# Patient Record
Sex: MALE | Race: WHITE | Employment: FULL TIME | URBAN - METROPOLITAN AREA
[De-identification: names, ages, dates, MRNs, and addresses within clinical notes are randomized per-mention and may not be internally consistent; named-entity substitution may affect disease eponyms.]

---

## 2017-01-19 ENCOUNTER — TELEPHONE (OUTPATIENT)
Dept: GASTROENTEROLOGY | Facility: CLINIC | Age: 62
End: 2017-01-19

## 2017-01-19 NOTE — TELEPHONE ENCOUNTER
Pt. States that he is having a CLN done on 1/24/17, and that [facility] with [facility] [employer]. He is requesting for RN to call in for the auth. Contact: [employer] [facility] [address]  Telephone:  [phone] - [place] Dept.     Pt's

## 2017-01-19 NOTE — TELEPHONE ENCOUNTER
Contacted Medical Cost mgmt at # below. Spoke with Jazmin @ [facility]. Screening colonoscopy does not require clinicals to be faxed. Obtained approval.    Moni Washington [id number].     Left detailed message that approved

## 2017-01-24 ENCOUNTER — SURGERY (OUTPATIENT)
Age: 62
End: 2017-01-24

## 2017-01-27 ENCOUNTER — TELEPHONE (OUTPATIENT)
Dept: GASTROENTEROLOGY | Facility: CLINIC | Age: 62
End: 2017-01-27

## 2017-02-14 RX ORDER — LOSARTAN POTASSIUM AND HYDROCHLOROTHIAZIDE 12.5; 1 MG/1; MG/1
TABLET ORAL
Qty: 30 TABLET | Refills: 5 | Status: SHIPPED | OUTPATIENT
Start: 2017-02-14 | End: 2017-04-15

## 2017-02-14 RX ORDER — AMLODIPINE BESYLATE 10 MG/1
TABLET ORAL
Qty: 30 TABLET | Refills: 5 | Status: SHIPPED | OUTPATIENT
Start: 2017-02-14 | End: 2017-04-15

## 2017-03-09 ENCOUNTER — TELEPHONE (OUTPATIENT)
Dept: INTERNAL MEDICINE CLINIC | Facility: CLINIC | Age: 62
End: 2017-03-09

## 2017-03-09 DIAGNOSIS — Z00.00 ROUTINE PHYSICAL EXAMINATION: Primary | ICD-10-CM

## 2017-04-14 ENCOUNTER — LAB ENCOUNTER (OUTPATIENT)
Dept: LAB | Facility: HOSPITAL | Age: 62
End: 2017-04-14
Attending: INTERNAL MEDICINE
Payer: COMMERCIAL

## 2017-04-14 ENCOUNTER — TELEPHONE (OUTPATIENT)
Dept: INTERNAL MEDICINE CLINIC | Facility: CLINIC | Age: 62
End: 2017-04-14

## 2017-04-14 DIAGNOSIS — Z00.00 ROUTINE PHYSICAL EXAMINATION: ICD-10-CM

## 2017-04-14 PROCEDURE — 85025 COMPLETE CBC W/AUTO DIFF WBC: CPT

## 2017-04-14 PROCEDURE — 80053 COMPREHEN METABOLIC PANEL: CPT

## 2017-04-14 PROCEDURE — 80061 LIPID PANEL: CPT

## 2017-04-14 PROCEDURE — 84443 ASSAY THYROID STIM HORMONE: CPT

## 2017-04-14 PROCEDURE — 36415 COLL VENOUS BLD VENIPUNCTURE: CPT

## 2017-04-14 PROCEDURE — 82607 VITAMIN B-12: CPT

## 2017-04-15 RX ORDER — LOSARTAN POTASSIUM AND HYDROCHLOROTHIAZIDE 12.5; 1 MG/1; MG/1
TABLET ORAL
Qty: 90 TABLET | Refills: 0 | Status: SHIPPED | OUTPATIENT
Start: 2017-04-15 | End: 2017-04-22

## 2017-04-15 RX ORDER — AMLODIPINE BESYLATE 10 MG/1
TABLET ORAL
Qty: 90 TABLET | Refills: 0 | Status: SHIPPED | OUTPATIENT
Start: 2017-04-15 | End: 2017-04-22

## 2017-04-22 ENCOUNTER — OFFICE VISIT (OUTPATIENT)
Dept: INTERNAL MEDICINE CLINIC | Facility: CLINIC | Age: 62
End: 2017-04-22

## 2017-04-22 VITALS
WEIGHT: 231 LBS | BODY MASS INDEX: 33.45 KG/M2 | HEART RATE: 77 BPM | SYSTOLIC BLOOD PRESSURE: 147 MMHG | HEIGHT: 69.5 IN | DIASTOLIC BLOOD PRESSURE: 78 MMHG | RESPIRATION RATE: 16 BRPM

## 2017-04-22 DIAGNOSIS — E78.5 DYSLIPIDEMIA: ICD-10-CM

## 2017-04-22 DIAGNOSIS — Z00.00 ROUTINE PHYSICAL EXAMINATION: Primary | ICD-10-CM

## 2017-04-22 DIAGNOSIS — K42.9 UMBILICAL HERNIA WITHOUT OBSTRUCTION AND WITHOUT GANGRENE: ICD-10-CM

## 2017-04-22 DIAGNOSIS — I10 ESSENTIAL HYPERTENSION WITH GOAL BLOOD PRESSURE LESS THAN 140/90: ICD-10-CM

## 2017-04-22 PROCEDURE — 99396 PREV VISIT EST AGE 40-64: CPT | Performed by: INTERNAL MEDICINE

## 2017-04-22 RX ORDER — LOSARTAN POTASSIUM AND HYDROCHLOROTHIAZIDE 12.5; 1 MG/1; MG/1
TABLET ORAL
Qty: 90 TABLET | Refills: 3 | Status: SHIPPED | OUTPATIENT
Start: 2017-04-22 | End: 2018-05-07

## 2017-04-22 RX ORDER — AMLODIPINE BESYLATE 10 MG/1
TABLET ORAL
Qty: 90 TABLET | Refills: 3 | Status: SHIPPED | OUTPATIENT
Start: 2017-04-22 | End: 2018-05-05

## 2017-04-22 NOTE — PROGRESS NOTES
HPI:    Patient ID: Mirza Chau is a 63year old male. HPI  Patient is here requesting yearly physical exam as well as follow-up on hypertension. Last seen here in November. At that time we stopped the Toprol.   He was having a lot of side effects nausea, vomiting, abdominal pain, diarrhea and constipation. Genitourinary: Negative for dysuria, hematuria and sexual dysfunction. Musculoskeletal: Positive for joint pain. Skin: Negative for rash.    Neurological: Negative for weakness, numbness and normal.   Skin: Skin is warm and dry. No rash noted. Psychiatric: He has a normal mood and affect. His behavior is normal. Judgment and thought content normal.       Body mass index is 33.64 kg/(m^2).          ASSESSMENT/PLAN:   (Z00.00) Routine physical

## 2017-08-28 ENCOUNTER — OFFICE VISIT (OUTPATIENT)
Dept: ORTHOPEDICS CLINIC | Facility: CLINIC | Age: 62
End: 2017-08-28

## 2017-08-28 VITALS — HEART RATE: 82 BPM | RESPIRATION RATE: 16 BRPM | DIASTOLIC BLOOD PRESSURE: 82 MMHG | SYSTOLIC BLOOD PRESSURE: 144 MMHG

## 2017-08-28 DIAGNOSIS — M23.91 ACUTE INTERNAL DERANGEMENT OF RIGHT KNEE: Primary | ICD-10-CM

## 2017-08-28 PROCEDURE — 99212 OFFICE O/P EST SF 10 MIN: CPT | Performed by: ORTHOPAEDIC SURGERY

## 2017-08-28 PROCEDURE — 99213 OFFICE O/P EST LOW 20 MIN: CPT | Performed by: ORTHOPAEDIC SURGERY

## 2017-08-28 NOTE — PROGRESS NOTES
Patient returns because of continued knee pain. This has been ongoing for several months if not years. He has already received 2 cortisone injections in his x-rays that are negative except for some very mild arthritis.   He did home exercise program as we

## 2017-09-01 ENCOUNTER — TELEPHONE (OUTPATIENT)
Dept: ORTHOPEDICS CLINIC | Facility: CLINIC | Age: 62
End: 2017-09-01

## 2017-09-01 NOTE — TELEPHONE ENCOUNTER
Called [facility] and s/w Margarita to initiate PA for MRI right knee. Gave clinicals per [facility] OV notes. MRI not meeting guidelines. Will go for add'l review.

## 2017-09-19 ENCOUNTER — HOSPITAL ENCOUNTER (OUTPATIENT)
Dept: MRI IMAGING | Age: 62
Discharge: HOME | End: 2017-09-19
Attending: ORTHOPAEDIC SURGERY
Payer: OTHER MISCELLANEOUS

## 2017-09-19 DIAGNOSIS — M23.91 ACUTE INTERNAL DERANGEMENT OF RIGHT KNEE: ICD-10-CM

## 2017-09-19 PROCEDURE — 73721 MRI JNT OF LWR EXTRE W/O DYE: CPT | Performed by: ORTHOPAEDIC SURGERY

## 2017-09-20 ENCOUNTER — OFFICE VISIT (OUTPATIENT)
Dept: ORTHOPEDICS CLINIC | Facility: CLINIC | Age: 62
End: 2017-09-20

## 2017-09-20 DIAGNOSIS — S83.231A COMPLEX TEAR OF MEDIAL MENISCUS OF RIGHT KNEE AS CURRENT INJURY, INITIAL ENCOUNTER: Primary | ICD-10-CM

## 2017-09-20 PROCEDURE — 99214 OFFICE O/P EST MOD 30 MIN: CPT | Performed by: ORTHOPAEDIC SURGERY

## 2017-09-20 PROCEDURE — 99212 OFFICE O/P EST SF 10 MIN: CPT | Performed by: ORTHOPAEDIC SURGERY

## 2017-09-20 NOTE — H&P
Chief Complaint: Right knee pain    NURSING INTAKE COMMENTS: Patient presents with:  Knee Pain: Right f/u and MRI results - states he is better for the last few days, still has pain rated as 4/10 on and off. History of present illness:   This 63 year o None      Patellar tendon None None        Jr Negative +             Stability Testing        Anterior Drawer Negative Negative      Posterior Drawer Negative Negative      Lachman Negative Negative                  Varus Stress        0 Degrees Nega

## 2017-11-08 ENCOUNTER — OFFICE VISIT (OUTPATIENT)
Dept: ORTHOPEDICS CLINIC | Facility: CLINIC | Age: 62
End: 2017-11-08

## 2017-11-08 VITALS — SYSTOLIC BLOOD PRESSURE: 146 MMHG | RESPIRATION RATE: 16 BRPM | DIASTOLIC BLOOD PRESSURE: 82 MMHG | HEART RATE: 80 BPM

## 2017-11-08 DIAGNOSIS — M17.11 PRIMARY LOCALIZED OSTEOARTHROSIS OF RIGHT LOWER LEG: Primary | ICD-10-CM

## 2017-11-08 PROCEDURE — 20610 DRAIN/INJ JOINT/BURSA W/O US: CPT | Performed by: ORTHOPAEDIC SURGERY

## 2017-11-08 NOTE — PROGRESS NOTES
Per verbal order from [facility], draw up 3ml of Kenalog 10 and 3ml of 1% lidocaine for cortisone injection to right knee.  Sera Mane

## 2018-05-07 RX ORDER — AMLODIPINE BESYLATE 10 MG/1
TABLET ORAL
Qty: 90 TABLET | Refills: 0 | Status: SHIPPED | OUTPATIENT
Start: 2018-05-07 | End: 2018-06-19

## 2018-05-08 DIAGNOSIS — I10 ESSENTIAL HYPERTENSION: Primary | ICD-10-CM

## 2018-05-08 RX ORDER — LOSARTAN POTASSIUM AND HYDROCHLOROTHIAZIDE 12.5; 1 MG/1; MG/1
TABLET ORAL
Qty: 90 TABLET | Refills: 0 | Status: SHIPPED | OUTPATIENT
Start: 2018-05-08 | End: 2018-06-19

## 2018-05-08 NOTE — TELEPHONE ENCOUNTER
Refill approved. Patient has a follow-up appointment scheduled with PCP on 6/19/18. Has not had labs in over one year. Orders generated. Please advise patient to come in 1-2 weeks prior to appointment to have labs checked.   Should be fasting for 12 hours,

## 2018-05-08 NOTE — TELEPHONE ENCOUNTER
Rx  Lorsatan hydrochlorothiazide 100-12.5 called into [facility], spoke to pharmacist Rosalia Morrison.

## 2018-05-08 NOTE — TELEPHONE ENCOUNTER
Dr Lizy Parker- Please advise as bloodwork is overdue for losartan/hctz and if you want bmp/cmp panel ordered? Thank you.

## 2018-05-08 NOTE — TELEPHONE ENCOUNTER
Hypertensive Medications  Protocol Criteria:  · Appointment scheduled in the past 6 months or in the next 3 months  · BMP or CMP in the past 12 months  · Creatinine result < 2  Recent Outpatient Visits            6 months ago Primary localized osteoarthros

## 2018-06-07 NOTE — PROGRESS NOTES
Pt was called regarding pending labs. States he will make sure to have labs done at least 3 days prior to upcoming appt on 06/19/18.

## 2018-06-09 ENCOUNTER — LAB ENCOUNTER (OUTPATIENT)
Dept: LAB | Facility: HOSPITAL | Age: 63
End: 2018-06-09
Attending: PHYSICIAN ASSISTANT
Payer: COMMERCIAL

## 2018-06-09 DIAGNOSIS — I10 ESSENTIAL HYPERTENSION: ICD-10-CM

## 2018-06-09 PROCEDURE — 85025 COMPLETE CBC W/AUTO DIFF WBC: CPT

## 2018-06-09 PROCEDURE — 84443 ASSAY THYROID STIM HORMONE: CPT

## 2018-06-09 PROCEDURE — 36415 COLL VENOUS BLD VENIPUNCTURE: CPT

## 2018-06-09 PROCEDURE — 80061 LIPID PANEL: CPT

## 2018-06-09 PROCEDURE — 80053 COMPREHEN METABOLIC PANEL: CPT

## 2018-06-19 ENCOUNTER — OFFICE VISIT (OUTPATIENT)
Dept: INTERNAL MEDICINE CLINIC | Facility: CLINIC | Age: 63
End: 2018-06-19

## 2018-06-19 VITALS
DIASTOLIC BLOOD PRESSURE: 80 MMHG | RESPIRATION RATE: 20 BRPM | BODY MASS INDEX: 35.14 KG/M2 | WEIGHT: 242.69 LBS | HEART RATE: 68 BPM | HEIGHT: 69.5 IN | SYSTOLIC BLOOD PRESSURE: 144 MMHG | TEMPERATURE: 98 F

## 2018-06-19 DIAGNOSIS — E78.5 DYSLIPIDEMIA: ICD-10-CM

## 2018-06-19 DIAGNOSIS — I10 ESSENTIAL HYPERTENSION WITH GOAL BLOOD PRESSURE LESS THAN 140/90: Primary | ICD-10-CM

## 2018-06-19 PROCEDURE — 99212 OFFICE O/P EST SF 10 MIN: CPT | Performed by: INTERNAL MEDICINE

## 2018-06-19 PROCEDURE — 99214 OFFICE O/P EST MOD 30 MIN: CPT | Performed by: INTERNAL MEDICINE

## 2018-06-19 RX ORDER — AMLODIPINE BESYLATE 10 MG/1
TABLET ORAL
Qty: 90 TABLET | Refills: 1 | Status: SHIPPED | OUTPATIENT
Start: 2018-06-19 | End: 2019-01-28

## 2018-06-19 RX ORDER — SPIRONOLACTONE 25 MG/1
25 TABLET ORAL DAILY
Qty: 90 TABLET | Refills: 1 | Status: SHIPPED | OUTPATIENT
Start: 2018-06-19 | End: 2018-07-31

## 2018-06-19 RX ORDER — LOSARTAN POTASSIUM AND HYDROCHLOROTHIAZIDE 12.5; 1 MG/1; MG/1
1 TABLET ORAL
Qty: 90 TABLET | Refills: 1 | Status: SHIPPED | OUTPATIENT
Start: 2018-06-19 | End: 2019-01-28

## 2018-06-19 NOTE — PROGRESS NOTES
HPI:    Patient ID: Jenny Johnson is a 63year old male. HPI  Patient is here for follow-up on chronic medical issues. Last seen here in April 2017 for general physical exam.  Blood pressure elevated at that time but no changes were made.   He has s vomiting, abdominal pain, diarrhea and constipation. Genitourinary: Negative for dysuria, hematuria and sexual dysfunction. Musculoskeletal: Negative for joint pain. Skin: Negative for rash.    Neurological: Negative for weakness, numbness and headach with the physician's assistant for me for recheck. Otherwise see me in 6 months.    (E78.5) Dyslipidemia  Plan: Recent blood work showed reasonably good control. Continue with diet and exercise.    (Z68.35) BMI 35.0-35.9,adult  Plan: Weight is up.   Misa

## 2018-06-19 NOTE — PATIENT INSTRUCTIONS
Return in 2-4 weeks to see the Adonis JACKSON for blood pressure check and blood test.   The blood test has been ordered already. Come to the lab a few days before your appointment to have the blood tested.

## 2018-06-25 ENCOUNTER — TELEPHONE (OUTPATIENT)
Dept: INTERNAL MEDICINE CLINIC | Facility: CLINIC | Age: 63
End: 2018-06-25

## 2018-06-25 NOTE — TELEPHONE ENCOUNTER
Advised patient on Dr. Tani Venegas information and recommendations. Patient verbalized understanding. Called pharmacy, spoke to pharmacist Terrance Hdez, advised of Dr Tani mcdonough.

## 2018-06-25 NOTE — TELEPHONE ENCOUNTER
We have ordered a BMP to be done in near future to check potassium. Please call pt and tell him to have it done 7-10 days after starting the spironolactone.  Please call and reassure the pharmacy that we are following the K

## 2018-06-25 NOTE — TELEPHONE ENCOUNTER
Pharmacy said possible drug interaction with Losartan & spironolactone   requesting confirmation if  Pt's  potassium being monitored

## 2018-07-13 ENCOUNTER — TELEPHONE (OUTPATIENT)
Dept: OTHER | Age: 63
End: 2018-07-13

## 2018-07-13 NOTE — TELEPHONE ENCOUNTER
Asking what needs to be done? Patient stated was seen on 6/19/18 and was prescribed spironolactone 25 MG Oral Tab for 2 weeks. Patient stated while taking he felt very drowsy and could not stay awake at work. He works with heavy equipment.    He stat

## 2018-07-14 NOTE — TELEPHONE ENCOUNTER
Call pt. Stop spironolactone. When he returns to normal, he can try a half dose on a day he is not working to see if he tolerates it. Keep appt in office to be seen soon. We will not start any new med right now.

## 2018-07-18 NOTE — TELEPHONE ENCOUNTER
I spoke with the patient. He is on vacation in [place] and the cellular phone service is spotty. He states he was very woozy and tired with the Aldactone. He is off of that now. Still on the amlodipine and losartan hydrochlorothiazide.   Blood pressure ar

## 2018-07-31 ENCOUNTER — OFFICE VISIT (OUTPATIENT)
Dept: INTERNAL MEDICINE CLINIC | Facility: CLINIC | Age: 63
End: 2018-07-31
Payer: COMMERCIAL

## 2018-07-31 VITALS
DIASTOLIC BLOOD PRESSURE: 80 MMHG | HEIGHT: 69 IN | HEART RATE: 62 BPM | SYSTOLIC BLOOD PRESSURE: 150 MMHG | WEIGHT: 239.19 LBS | BODY MASS INDEX: 35.43 KG/M2

## 2018-07-31 DIAGNOSIS — I10 ESSENTIAL HYPERTENSION: Primary | ICD-10-CM

## 2018-07-31 PROCEDURE — 99213 OFFICE O/P EST LOW 20 MIN: CPT | Performed by: PHYSICIAN ASSISTANT

## 2018-07-31 RX ORDER — CLONIDINE HYDROCHLORIDE 0.1 MG/1
0.1 TABLET ORAL DAILY
Qty: 30 TABLET | Refills: 5 | Status: SHIPPED | OUTPATIENT
Start: 2018-07-31 | End: 2018-08-30

## 2018-07-31 NOTE — PROGRESS NOTES
HPI:    Patient ID: Arnold Tong is a 63year old male. HPI   Patient presents today for follow-up of hypertension. He was last seen in the office by PCP on 6/19/18.   At that time we continued losartan hydrochlorothiazide and amlodipine and we sta been unable to tolerate beta-blockers, higher doses of hydrochlorothiazide, and spironolactone in the past.  Advised to continue on losartan hydrochlorothiazide at the current dose. Start clonidine 0.1 mg once daily.   Try to maintain a healthy low-sodium

## 2018-08-01 RX ORDER — AMLODIPINE BESYLATE 10 MG/1
TABLET ORAL
Qty: 90 TABLET | Refills: 0 | Status: SHIPPED | OUTPATIENT
Start: 2018-08-01 | End: 2018-08-30 | Stop reason: CLARIF

## 2018-08-01 RX ORDER — LOSARTAN POTASSIUM AND HYDROCHLOROTHIAZIDE 12.5; 1 MG/1; MG/1
TABLET ORAL
Qty: 90 TABLET | Refills: 0 | Status: SHIPPED | OUTPATIENT
Start: 2018-08-01 | End: 2018-08-30 | Stop reason: CLARIF

## 2018-08-02 NOTE — TELEPHONE ENCOUNTER
Hypertensive Medications  Protocol Criteria:  · Appointment scheduled in the past 6 months or in the next 3 months  · BMP or CMP in the past 12 months  · Creatinine result < 2  Recent Outpatient Visits            [facility]

## 2018-08-02 NOTE — TELEPHONE ENCOUNTER
Hypertensive Medications  Protocol Criteria:  · Appointment scheduled in the past 6 months or in the next 3 months  · BMP or CMP in the past 12 months  · Creatinine result < 2  Recent Outpatient Visits            [facility]

## 2018-08-30 ENCOUNTER — OFFICE VISIT (OUTPATIENT)
Dept: INTERNAL MEDICINE CLINIC | Facility: CLINIC | Age: 63
End: 2018-08-30
Payer: COMMERCIAL

## 2018-08-30 VITALS
HEIGHT: 68 IN | BODY MASS INDEX: 32.56 KG/M2 | HEART RATE: 62 BPM | DIASTOLIC BLOOD PRESSURE: 70 MMHG | SYSTOLIC BLOOD PRESSURE: 124 MMHG | WEIGHT: 214.81 LBS

## 2018-08-30 DIAGNOSIS — I10 ESSENTIAL HYPERTENSION: Primary | ICD-10-CM

## 2018-08-30 PROCEDURE — 99213 OFFICE O/P EST LOW 20 MIN: CPT | Performed by: PHYSICIAN ASSISTANT

## 2018-08-30 PROCEDURE — 99212 OFFICE O/P EST SF 10 MIN: CPT | Performed by: PHYSICIAN ASSISTANT

## 2018-08-30 RX ORDER — HYDRALAZINE HYDROCHLORIDE 25 MG/1
25 TABLET, FILM COATED ORAL DAILY
Qty: 30 TABLET | Refills: 5 | Status: SHIPPED | OUTPATIENT
Start: 2018-08-30 | End: 2018-11-29 | Stop reason: DRUGHIGH

## 2018-08-30 NOTE — PROGRESS NOTES
HPI:    Patient ID: Tony Donald is a 63year old male. HPI   Patient presents today for follow-up of hypertension. He was last in the office 1 month ago.   At that time we continued losartan hydrochlorothiazide and amlodipine and started clonidine 0 wheezes. He has no rales. Neurological: He is alert. Psychiatric: He has a normal mood and affect. ASSESSMENT/PLAN:   1. Essential hypertension  Blood pressure well controlled.   Patient having significant dry mouth and clonidine and would

## 2018-09-18 ENCOUNTER — TELEPHONE (OUTPATIENT)
Dept: OTHER | Age: 63
End: 2018-09-18

## 2018-09-18 NOTE — TELEPHONE ENCOUNTER
Karla Toro=please see message below and advise.     Patient calling and states LOV 8/30/18 with Barney Willson was prescribed with Hydralazine , states that he developed rashes on both ankle and legs, stopped taking Hydralazine for 4 days now, rashes helene

## 2018-09-18 NOTE — TELEPHONE ENCOUNTER
Need to discuss further with PCP. Management of his hypertension has been quite difficult due to the multitude of adverse reactions he has had. For now, stay off hydralazine. Continue amlodipine and losartan-HCTZ at the current doses.  Maintain a strict low

## 2018-09-19 NOTE — TELEPHONE ENCOUNTER
Discussed the case with Dr. Prateek Cr. As his blood pressures off the hydralazine has been averaging 130/80s it is reasonable to keep him on his current regimen of amlodipine and losartan–hydrochlorothiazide for now.   Spoke to patient about this and advised

## 2018-09-19 NOTE — TELEPHONE ENCOUNTER
KATIE Toro, [facility], or should he schedule to see Dr Js Berkowitz? Advised patient on KATIE Toro's information and recommendations. Patient verbalized understanding, had no questions.

## 2018-10-04 ENCOUNTER — OFFICE VISIT (OUTPATIENT)
Dept: INTERNAL MEDICINE CLINIC | Facility: CLINIC | Age: 63
End: 2018-10-04
Payer: COMMERCIAL

## 2018-10-04 VITALS
BODY MASS INDEX: 35.71 KG/M2 | SYSTOLIC BLOOD PRESSURE: 162 MMHG | HEART RATE: 71 BPM | DIASTOLIC BLOOD PRESSURE: 70 MMHG | HEIGHT: 68 IN | WEIGHT: 235.63 LBS

## 2018-10-04 DIAGNOSIS — I10 ESSENTIAL HYPERTENSION: Primary | ICD-10-CM

## 2018-10-04 PROCEDURE — 99212 OFFICE O/P EST SF 10 MIN: CPT | Performed by: PHYSICIAN ASSISTANT

## 2018-10-04 PROCEDURE — 99213 OFFICE O/P EST LOW 20 MIN: CPT | Performed by: PHYSICIAN ASSISTANT

## 2018-10-04 RX ORDER — METHYLDOPA 250 MG/1
250 TABLET, FILM COATED ORAL 2 TIMES DAILY
Qty: 60 TABLET | Refills: 5 | Status: SHIPPED | OUTPATIENT
Start: 2018-10-04 | End: 2018-11-01

## 2018-10-04 NOTE — PROGRESS NOTES
HPI:    Patient ID: Rafy Monahan is a 66year old male. HPI   Patient presents today for follow-up of hypertension. He was last in the office 1 month ago.   At that time we started hydralazine 25 mg once daily and continued amlodipine and losartan–HC motion. Neck supple. Cardiovascular: Normal rate, regular rhythm, normal heart sounds and intact distal pulses. Edema not present. Pulmonary/Chest: Effort normal and breath sounds normal. He has no wheezes. He has no rales.    Neurological: He is ira

## 2018-11-01 ENCOUNTER — OFFICE VISIT (OUTPATIENT)
Dept: INTERNAL MEDICINE CLINIC | Facility: CLINIC | Age: 63
End: 2018-11-01
Payer: COMMERCIAL

## 2018-11-01 VITALS
HEART RATE: 67 BPM | WEIGHT: 238.63 LBS | DIASTOLIC BLOOD PRESSURE: 76 MMHG | HEIGHT: 68 IN | SYSTOLIC BLOOD PRESSURE: 148 MMHG | BODY MASS INDEX: 36.17 KG/M2

## 2018-11-01 DIAGNOSIS — I10 ESSENTIAL HYPERTENSION: Primary | ICD-10-CM

## 2018-11-01 PROCEDURE — 99213 OFFICE O/P EST LOW 20 MIN: CPT | Performed by: PHYSICIAN ASSISTANT

## 2018-11-01 PROCEDURE — 99212 OFFICE O/P EST SF 10 MIN: CPT | Performed by: PHYSICIAN ASSISTANT

## 2018-11-01 NOTE — PROGRESS NOTES
HPI:    Patient ID: Diana Avitia is a 66year old male. HPI   Patient presents today for follow-up of hypertension. She was last seen in the office on 10/4/18. At that time we started him on methyldopa 250 mg twice daily.   We continued him on amlod well-developed and well-nourished. HENT:   Head: Normocephalic and atraumatic. Mouth/Throat: Oropharynx is clear and moist.   Eyes: Conjunctivae are normal. Pupils are equal, round, and reactive to light. Neck: Normal range of motion. Neck supple.

## 2018-11-29 ENCOUNTER — OFFICE VISIT (OUTPATIENT)
Dept: INTERNAL MEDICINE CLINIC | Facility: CLINIC | Age: 63
End: 2018-11-29
Payer: COMMERCIAL

## 2018-11-29 VITALS
SYSTOLIC BLOOD PRESSURE: 146 MMHG | BODY MASS INDEX: 36.37 KG/M2 | HEIGHT: 68 IN | WEIGHT: 240 LBS | DIASTOLIC BLOOD PRESSURE: 74 MMHG | HEART RATE: 72 BPM

## 2018-11-29 DIAGNOSIS — J01.90 ACUTE NON-RECURRENT SINUSITIS, UNSPECIFIED LOCATION: ICD-10-CM

## 2018-11-29 DIAGNOSIS — I10 ESSENTIAL HYPERTENSION: Primary | ICD-10-CM

## 2018-11-29 PROCEDURE — 99212 OFFICE O/P EST SF 10 MIN: CPT | Performed by: PHYSICIAN ASSISTANT

## 2018-11-29 PROCEDURE — 99214 OFFICE O/P EST MOD 30 MIN: CPT | Performed by: PHYSICIAN ASSISTANT

## 2018-11-29 RX ORDER — FLUTICASONE PROPIONATE 50 MCG
2 SPRAY, SUSPENSION (ML) NASAL DAILY
Qty: 1 BOTTLE | Refills: 3 | Status: SHIPPED | OUTPATIENT
Start: 2018-11-29 | End: 2019-02-18

## 2018-11-29 RX ORDER — HYDRALAZINE HYDROCHLORIDE 50 MG/1
50 TABLET, FILM COATED ORAL 2 TIMES DAILY
Qty: 60 TABLET | Refills: 5 | Status: SHIPPED | OUTPATIENT
Start: 2018-11-29 | End: 2019-07-31

## 2018-11-29 RX ORDER — AZITHROMYCIN 250 MG/1
TABLET, FILM COATED ORAL
Qty: 6 TABLET | Refills: 0 | Status: SHIPPED | OUTPATIENT
Start: 2018-11-29 | End: 2018-12-19 | Stop reason: ALTCHOICE

## 2018-11-29 NOTE — PROGRESS NOTES
HPI:    Patient ID: Marcio Carmichael is a 66year old male. HPI   Patient presents today for follow-up of hypertension. He was last seen in the office on 11/1/18.   At that time continued amlodipine 10 mg and losartan-HCTZ 100-12.5 mg and started him monserrat Constitutional: He appears well-developed and well-nourished. HENT:   Head: Normocephalic and atraumatic. Right Ear: A middle ear effusion is present. Left Ear: A middle ear effusion is present. Nose: Mucosal edema and rhinorrhea present.    Mouth tablets by mouth today, then one daily. • Fluticasone Propionate 50 MCG/ACT Nasal Suspension 1 Bottle 3     Si sprays by Each Nare route daily.        Imaging & Referrals:  None         YY#[id number]

## 2018-11-29 NOTE — PATIENT INSTRUCTIONS
Take Hydralazine 50 mg once daily for 1-2 weeks and if tolerating well increase up to twice daily  Contact the office in one month with blood pressure readings.    Goal is less than 130/80

## 2018-12-04 ENCOUNTER — TELEPHONE (OUTPATIENT)
Dept: INTERNAL MEDICINE CLINIC | Facility: CLINIC | Age: 63
End: 2018-12-04

## 2018-12-04 RX ORDER — AZELASTINE HCL 205.5 UG/1
1-2 SPRAY NASAL 2 TIMES DAILY
Qty: 30 ML | Refills: 0 | Status: SHIPPED | OUTPATIENT
Start: 2018-12-04 | End: 2019-02-18

## 2018-12-19 ENCOUNTER — OFFICE VISIT (OUTPATIENT)
Dept: ORTHOPEDICS CLINIC | Facility: CLINIC | Age: 63
End: 2018-12-19
Payer: COMMERCIAL

## 2018-12-19 ENCOUNTER — TELEPHONE (OUTPATIENT)
Dept: ORTHOPEDICS CLINIC | Facility: CLINIC | Age: 63
End: 2018-12-19

## 2018-12-19 DIAGNOSIS — S83.231A COMPLEX TEAR OF MEDIAL MENISCUS OF RIGHT KNEE AS CURRENT INJURY, INITIAL ENCOUNTER: Primary | ICD-10-CM

## 2018-12-19 PROCEDURE — 99212 OFFICE O/P EST SF 10 MIN: CPT | Performed by: ORTHOPAEDIC SURGERY

## 2018-12-19 PROCEDURE — 99213 OFFICE O/P EST LOW 20 MIN: CPT | Performed by: ORTHOPAEDIC SURGERY

## 2018-12-19 NOTE — TELEPHONE ENCOUNTER
Surgery order is from Sept 2017. Work comp acceptance of surgery 12-12-18  Surgery order over a year old. Last office visit November 2018    Shall we see pt again and write a new order?   Tasking to Dr. Linda Garcia  Tasking to Floresita Sousa

## 2018-12-19 NOTE — H&P
Chief Complaint: right knee pain    NURSING INTAKE COMMENTS: Patient presents with:  Knee Pain: Pt is here for his right knee. Pain level on walking 2-3. Pain level if twisted 10. Pt has been qapproved from [facility] for surgery;  Pt to discuss surgery Varus Stress        0 Degrees  Negative      30 Degrees  Negative        Valgus Stress         0 Degrees  Negative      30 Degrees  Negative        Patellar apprehension  Negative   Patellofemoral pain  +             Patellar Grind test  +   Hip Motion

## 2018-12-19 NOTE — TELEPHONE ENCOUNTER
I do not need to see the patient again. Let me know Beatriz Alejandro if you need a new surgical order or if the old one will suffice.

## 2019-01-08 ENCOUNTER — TELEPHONE (OUTPATIENT)
Dept: ORTHOPEDICS CLINIC | Facility: CLINIC | Age: 64
End: 2019-01-08

## 2019-01-08 NOTE — TELEPHONE ENCOUNTER
Surgery for right knee scope by [facility] on 1/22/19 is approve by work comp by the [employer]. Scanned in the authorization letter.

## 2019-01-09 ENCOUNTER — TELEPHONE (OUTPATIENT)
Dept: ORTHOPEDICS CLINIC | Facility: CLINIC | Age: 64
End: 2019-01-09

## 2019-01-09 NOTE — TELEPHONE ENCOUNTER
Patients work comp approved surgery   I have his surgery date as 1-22-19  Dr. Ke Kearney.   Is this correct    Tasking to Kaz Mendes

## 2019-01-10 NOTE — TELEPHONE ENCOUNTER
I am at the [facility], It would be better to call over to the office. I can not do anything from here.

## 2019-01-24 ENCOUNTER — HOSPITAL ENCOUNTER (OUTPATIENT)
Dept: ULTRASOUND IMAGING | Facility: HOSPITAL | Age: 64
Discharge: HOME | End: 2019-01-24
Attending: ORTHOPAEDIC SURGERY
Payer: OTHER MISCELLANEOUS

## 2019-01-24 ENCOUNTER — TELEPHONE (OUTPATIENT)
Dept: ORTHOPEDICS CLINIC | Facility: CLINIC | Age: 64
End: 2019-01-24

## 2019-01-24 DIAGNOSIS — R60.9 SWELLING: Primary | ICD-10-CM

## 2019-01-24 DIAGNOSIS — R60.9 SWELLING: ICD-10-CM

## 2019-01-24 PROCEDURE — 93971 EXTREMITY STUDY: CPT | Performed by: ORTHOPAEDIC SURGERY

## 2019-01-24 NOTE — TELEPHONE ENCOUNTER
Spoke to pt. Pt had surgery on 01/22/19 for right knee meniscus. States that started yesterday, the swelling and pain has increased. Rates pain 10/10. States swelling is from 2 inches above knee that goes all the way down to the foot.  States he has tightne

## 2019-01-24 NOTE — TELEPHONE ENCOUNTER
Venous Doppler for RLE placed in pt's chart to be done STAT. Called US and spoke to [address]. Pt is scheduled for venous doppler at 5:00pm today at [address]. Spoke to pt and informed him of [facility] orders.  Gave pt directions to [facility] and ti

## 2019-01-28 RX ORDER — AMLODIPINE BESYLATE 10 MG/1
TABLET ORAL
Qty: 90 TABLET | Refills: 0 | Status: SHIPPED | OUTPATIENT
Start: 2019-01-28 | End: 2019-05-01

## 2019-01-28 RX ORDER — LOSARTAN POTASSIUM AND HYDROCHLOROTHIAZIDE 12.5; 1 MG/1; MG/1
TABLET ORAL
Qty: 90 TABLET | Refills: 0 | Status: SHIPPED | OUTPATIENT
Start: 2019-01-28 | End: 2019-05-01

## 2019-01-31 ENCOUNTER — TELEPHONE (OUTPATIENT)
Dept: ORTHOPEDICS CLINIC | Facility: CLINIC | Age: 64
End: 2019-01-31

## 2019-01-31 NOTE — TELEPHONE ENCOUNTER
Pt had knee surgery on 1/22- pt needs note sent for [facility] claim - note needs to say he will be off for 2 weeks until he is cleared by Dr - asking for it to be faxed to Adilson Franklin at [phone] at [employer] = pls call dakota sent

## 2019-02-04 ENCOUNTER — OFFICE VISIT (OUTPATIENT)
Dept: ORTHOPEDICS CLINIC | Facility: CLINIC | Age: 64
End: 2019-02-04
Payer: OTHER MISCELLANEOUS

## 2019-02-04 DIAGNOSIS — S83.231A COMPLEX TEAR OF MEDIAL MENISCUS OF RIGHT KNEE AS CURRENT INJURY, INITIAL ENCOUNTER: Primary | ICD-10-CM

## 2019-02-04 PROCEDURE — 99212 OFFICE O/P EST SF 10 MIN: CPT | Performed by: ORTHOPAEDIC SURGERY

## 2019-02-04 PROCEDURE — 99024 POSTOP FOLLOW-UP VISIT: CPT | Performed by: ORTHOPAEDIC SURGERY

## 2019-02-04 NOTE — PROGRESS NOTES
NURSING INTAKE COMMENTS: Patient presents with:  Post-Op: Pt is here for  a post operative visit. Right knee arthroscopy surgery  two weeks ago. Pain level  varies. At rest sitting.  2-3 and then on acitivity pain level   6-7  Knee Pain      Patient presen Tobacco Use      Smoking status: Never Smoker      Smokeless tobacco: Never Used    Substance and Sexual Activity      Alcohol use: No        Alcohol/week: 0.0 oz      Drug use: No      Sexual activity: Not Currently        Partners: Female        Physical

## 2019-02-06 ENCOUNTER — OFFICE VISIT (OUTPATIENT)
Dept: PHYSICAL THERAPY | Facility: HOSPITAL | Age: 64
End: 2019-02-06
Attending: ORTHOPAEDIC SURGERY
Payer: OTHER MISCELLANEOUS

## 2019-02-06 DIAGNOSIS — S83.231A COMPLEX TEAR OF MEDIAL MENISCUS OF RIGHT KNEE AS CURRENT INJURY, INITIAL ENCOUNTER: ICD-10-CM

## 2019-02-06 PROCEDURE — 97110 THERAPEUTIC EXERCISES: CPT

## 2019-02-06 PROCEDURE — 97161 PT EVAL LOW COMPLEX 20 MIN: CPT

## 2019-02-06 NOTE — PROGRESS NOTES
POST-OP KNEE EVALUATION:   Referring Physician: Dr. Faisal Garcia  Diagnosis: L99.442O (ICD-10-CM) - 836.0 (ICD-9-CM) - Complex tear of medial meniscus of right knee as current injury, initial encounter  DOS: 1/22/19  DOI: 7/2016      Date of Service: 2/6/2 Lisa Saha is a 66year old y/o male who presents to therapy today s/p  Arthroscopy on 1/22/19 for a complex tear of right medial knee meniscus with complaints of right knee pain, sometimes sharp and sometimes ache.  Pt describes pain level current PLAN OF CARE:    Goals:   At 8 sessions  · Pt will improve knee extension ROM to 0- -2 deg to allow proper heel strike during gait and terminal knee extension in stance  · Pt will improve knee AROM flexion to > 125 degrees to improve ability to perf care.      X______________________________________________ Date________________  Certification From: 2/6/2806      To: 5/7/2019

## 2019-02-08 ENCOUNTER — TELEPHONE (OUTPATIENT)
Dept: ORTHOPEDICS CLINIC | Facility: CLINIC | Age: 64
End: 2019-02-08

## 2019-02-08 NOTE — TELEPHONE ENCOUNTER
Pt calling to request note for his workers comp. Pt needs note to state how long he has to be excused from work from 1/22/19-2/4/19. Pt will  note in office once ready. Please call.

## 2019-02-11 NOTE — TELEPHONE ENCOUNTER
Pt called LM and I returned the call 2-11-19. Requests that PHI be faxed to his insurance - [employer].  I reached to MT RN in ortho first.  PHI was faxed per requests. NC     I did provide the  to reach out to [employer] in the future. Contact info provided to . I was scan in copies. NO addl action is needed. Pt will be informed.       ALEXANDER

## 2019-02-11 NOTE — TELEPHONE ENCOUNTER
Off work completely for 4 weeks from the date of surgery. Done light duty no lifting greater than 10 pounds, ground-level work, no climbing or crawling for an additional 4 weeks. Then return to work full duty without restrictions.

## 2019-02-12 ENCOUNTER — OFFICE VISIT (OUTPATIENT)
Dept: PHYSICAL THERAPY | Facility: HOSPITAL | Age: 64
End: 2019-02-12
Attending: ORTHOPAEDIC SURGERY
Payer: OTHER MISCELLANEOUS

## 2019-02-12 DIAGNOSIS — S83.231A COMPLEX TEAR OF MEDIAL MENISCUS OF RIGHT KNEE AS CURRENT INJURY, INITIAL ENCOUNTER: ICD-10-CM

## 2019-02-12 PROCEDURE — 97110 THERAPEUTIC EXERCISES: CPT

## 2019-02-12 NOTE — PROGRESS NOTES
Diagnosis: S83.231A (ICD-10-CM) - 836.0 (ICD-9-CM) - Complex tear of medial meniscus of right knee as current injury, initial encounter  Authorized # of Visits:  8 (WC)         Next MD visit: 2/18/19  Fall Risk: standard         Precautions:previous back i independence with gait on uneven surfaces such as grass   · Pt will be independent and compliant with comprehensive HEP to maintain progress achieved in PT  · Patient will RTW will satisfactory tolerance.         Plan: Continue for stretching, strengthening

## 2019-02-14 ENCOUNTER — OFFICE VISIT (OUTPATIENT)
Dept: PHYSICAL THERAPY | Facility: HOSPITAL | Age: 64
End: 2019-02-14
Attending: ORTHOPAEDIC SURGERY
Payer: OTHER MISCELLANEOUS

## 2019-02-14 DIAGNOSIS — S83.231A COMPLEX TEAR OF MEDIAL MENISCUS OF RIGHT KNEE AS CURRENT INJURY, INITIAL ENCOUNTER: ICD-10-CM

## 2019-02-14 PROCEDURE — 97110 THERAPEUTIC EXERCISES: CPT

## 2019-02-14 NOTE — PROGRESS NOTES
Diagnosis: S83.231A (ICD-10-CM) - 836.0 (ICD-9-CM) - Complex tear of medial meniscus of right knee as current injury, initial encounter  Authorized # of Visits:  8 (WC)         Next MD visit: 2/18/19  Fall Risk: standard         Precautions:previous back i and down from the floor safely  · Pt will demonstrate increased hip ER/ABD strength to 4+/5 to perform stepping and squatting activities without excessive femoral IR/ADD  · Pt will improve SLS to >30+s to improve safety and independence with gait on uneven

## 2019-02-18 ENCOUNTER — OFFICE VISIT (OUTPATIENT)
Dept: ORTHOPEDICS CLINIC | Facility: CLINIC | Age: 64
End: 2019-02-18
Payer: OTHER MISCELLANEOUS

## 2019-02-18 DIAGNOSIS — M17.11 PRIMARY LOCALIZED OSTEOARTHROSIS OF RIGHT LOWER LEG: ICD-10-CM

## 2019-02-18 DIAGNOSIS — S83.231A COMPLEX TEAR OF MEDIAL MENISCUS OF RIGHT KNEE AS CURRENT INJURY, INITIAL ENCOUNTER: Primary | ICD-10-CM

## 2019-02-18 PROCEDURE — 99024 POSTOP FOLLOW-UP VISIT: CPT | Performed by: ORTHOPAEDIC SURGERY

## 2019-02-18 PROCEDURE — 99212 OFFICE O/P EST SF 10 MIN: CPT | Performed by: ORTHOPAEDIC SURGERY

## 2019-02-18 NOTE — PROGRESS NOTES
NURSING INTAKE COMMENTS: Patient presents with:  Post-Op: s/p right knee -- Just started PT. Rates pain 5/27 with certain activity. Pt has occasional calf pain. Denies numbness, tingling or fever      Patient presents 1 month status post right knee scope. neurovascularly intact with no focal deficits. Patient denies any calf pain. Imaging: none    Domenica Melendez was seen today for post-op.     Diagnoses and all orders for this visit:    Complex tear of medial meniscus of right knee as current injury, initial enc

## 2019-02-19 ENCOUNTER — OFFICE VISIT (OUTPATIENT)
Dept: PHYSICAL THERAPY | Facility: HOSPITAL | Age: 64
End: 2019-02-19
Attending: ORTHOPAEDIC SURGERY
Payer: OTHER MISCELLANEOUS

## 2019-02-19 DIAGNOSIS — S83.231A COMPLEX TEAR OF MEDIAL MENISCUS OF RIGHT KNEE AS CURRENT INJURY, INITIAL ENCOUNTER: ICD-10-CM

## 2019-02-19 PROCEDURE — 97110 THERAPEUTIC EXERCISES: CPT

## 2019-02-19 NOTE — PROGRESS NOTES
Diagnosis: S83.231A (ICD-10-CM) - 836.0 (ICD-9-CM) - Complex tear of medial meniscus of right knee as current injury, initial encounter  Authorized # of Visits:  8 (WC)         Next MD visit: 3/18/19  Fall Risk: standard         Precautions:previous back i and terminal knee extension in stance  · Pt will improve knee AROM flexion to > 125 degrees to improve ability to perform ease of transfers in and out car and facilitate donning and doffing socks and shoes.   · Pt will improve quad strength to 5-/5 or roberto

## 2019-02-21 ENCOUNTER — OFFICE VISIT (OUTPATIENT)
Dept: PHYSICAL THERAPY | Facility: HOSPITAL | Age: 64
End: 2019-02-21
Attending: ORTHOPAEDIC SURGERY
Payer: OTHER MISCELLANEOUS

## 2019-02-21 DIAGNOSIS — S83.231A COMPLEX TEAR OF MEDIAL MENISCUS OF RIGHT KNEE AS CURRENT INJURY, INITIAL ENCOUNTER: ICD-10-CM

## 2019-02-21 PROCEDURE — 97110 THERAPEUTIC EXERCISES: CPT

## 2019-02-21 NOTE — PROGRESS NOTES
Diagnosis: S83.231A (ICD-10-CM) - 836.0 (ICD-9-CM) - Complex tear of medial meniscus of right knee as current injury, initial encounter  Authorized # of Visits:  8 (WC)         Next MD visit: 3/18/19  Fall Risk: standard         Precautions:previous back i quad stretch, clam HEP: HEP; continue with SLS           Assessment: Pt reported return to work with gradual improved work tolerance. Better stepdown ability    Goals:   At 8 sessions  · Pt will improve knee extension ROM to 0- -2 deg to allow proper heel s

## 2019-02-26 ENCOUNTER — OFFICE VISIT (OUTPATIENT)
Dept: PHYSICAL THERAPY | Facility: HOSPITAL | Age: 64
End: 2019-02-26
Attending: ORTHOPAEDIC SURGERY
Payer: OTHER MISCELLANEOUS

## 2019-02-26 DIAGNOSIS — S83.231A COMPLEX TEAR OF MEDIAL MENISCUS OF RIGHT KNEE AS CURRENT INJURY, INITIAL ENCOUNTER: ICD-10-CM

## 2019-02-26 PROCEDURE — 97116 GAIT TRAINING THERAPY: CPT

## 2019-02-26 PROCEDURE — 97110 THERAPEUTIC EXERCISES: CPT

## 2019-02-26 NOTE — PROGRESS NOTES
Diagnosis: S83.231A (ICD-10-CM) - 836.0 (ICD-9-CM) - Complex tear of medial meniscus of right knee as current injury, initial encounter  Authorized # of Visits:  8 (WC)         Next MD visit: 3/18/19  Fall Risk: standard         Precautions:previous back i -Gait training to avoid varus moment at R heel strike and fire vmo   HEP:  Prone quad stretch, clam HEP: HEP; continue with SLS HEP:  Bridging with ball squeeze           Assessment: Gait training to activate VMO to reduce varus stress on the knee during

## 2019-02-28 ENCOUNTER — OFFICE VISIT (OUTPATIENT)
Dept: PHYSICAL THERAPY | Facility: HOSPITAL | Age: 64
End: 2019-02-28
Attending: ORTHOPAEDIC SURGERY
Payer: OTHER MISCELLANEOUS

## 2019-02-28 DIAGNOSIS — S83.231A COMPLEX TEAR OF MEDIAL MENISCUS OF RIGHT KNEE AS CURRENT INJURY, INITIAL ENCOUNTER: ICD-10-CM

## 2019-02-28 PROCEDURE — 97110 THERAPEUTIC EXERCISES: CPT

## 2019-02-28 NOTE — PROGRESS NOTES
Diagnosis: S83.231A (ICD-10-CM) - 836.0 (ICD-9-CM) - Complex tear of medial meniscus of right knee as current injury, initial encounter  Authorized # of Visits:  8 (WC)         Next MD visit: 3/18/19  Fall Risk: standard         Precautions:previous back i -Rockerboard level 2 r/l, a/p x 15 each  -Incline board L4 gastroc stretch 30\" x 3      -Gait training to avoid varus moment at R heel strike and fire vmo    HEP: HEP; continue with SLS HEP:  Bridging with ball squeeze            Assessment: Pain decrea

## 2019-03-05 ENCOUNTER — OFFICE VISIT (OUTPATIENT)
Dept: PHYSICAL THERAPY | Facility: HOSPITAL | Age: 64
End: 2019-03-05
Attending: ORTHOPAEDIC SURGERY
Payer: OTHER MISCELLANEOUS

## 2019-03-05 DIAGNOSIS — S83.231A COMPLEX TEAR OF MEDIAL MENISCUS OF RIGHT KNEE AS CURRENT INJURY, INITIAL ENCOUNTER: ICD-10-CM

## 2019-03-05 PROCEDURE — 97110 THERAPEUTIC EXERCISES: CPT

## 2019-03-05 NOTE — PROGRESS NOTES
Progress Summary  Patient Name: Diane Alexnader, : 1955, MRN: [id number]   Date:  3/5/2019  Referring Physician:  Adolfo Hay    Diagnosis: I30.069U (ICD-10-CM) - 836.0 (ICD-9-CM) - Complex tear of medial meniscus of right knee as current heel strike during gait and terminal knee extension in stance  MET  · Pt will improve knee AROM flexion to > 125 degrees to improve ability to perform ease of transfers in and out car and facilitate donning and doffing socks and shoes.   MET  · Pt will impr pain free  -TKE's with towel squeeze 5\" hold 2 x 15  -Bridging with ball squeeze 5\" 2 x 15  -Fwd step up with RTB lateral force 4\"   -Lateral step up 6\" 2 x 10    - -NuStep seat 9 L6 x 6 min  -1/4 wall squat with ball squeeze 5\" hold x 15 in pain free

## 2019-03-07 ENCOUNTER — APPOINTMENT (OUTPATIENT)
Dept: PHYSICAL THERAPY | Facility: HOSPITAL | Age: 64
End: 2019-03-07
Attending: ORTHOPAEDIC SURGERY
Payer: OTHER MISCELLANEOUS

## 2019-03-13 ENCOUNTER — OFFICE VISIT (OUTPATIENT)
Dept: ORTHOPEDICS CLINIC | Facility: CLINIC | Age: 64
End: 2019-03-13
Payer: OTHER MISCELLANEOUS

## 2019-03-13 DIAGNOSIS — S83.231A COMPLEX TEAR OF MEDIAL MENISCUS OF RIGHT KNEE AS CURRENT INJURY, INITIAL ENCOUNTER: Primary | ICD-10-CM

## 2019-03-13 PROCEDURE — 99212 OFFICE O/P EST SF 10 MIN: CPT | Performed by: ORTHOPAEDIC SURGERY

## 2019-03-13 PROCEDURE — 99024 POSTOP FOLLOW-UP VISIT: CPT | Performed by: ORTHOPAEDIC SURGERY

## 2019-03-13 NOTE — PROGRESS NOTES
NURSING INTAKE COMMENTS: Patient presents with:  Post-Op: s/p right knee scope -- Going to PT. Rates pain 2/10 while sitting and goes up to 4-5/10 while standing at work. Denies fever and chills. States right calf has occasional tightness.        Patient pr appropriate. The operative extremity is neurovascularly intact with no focal deficits. Patient denies any calf pain. Imaging: none    Jamison Mcardle was seen today for post-op.     Diagnoses and all orders for this visit:    Complex tear of medial meniscus of

## 2019-03-14 ENCOUNTER — OFFICE VISIT (OUTPATIENT)
Dept: PHYSICAL THERAPY | Facility: HOSPITAL | Age: 64
End: 2019-03-14
Attending: ORTHOPAEDIC SURGERY
Payer: OTHER MISCELLANEOUS

## 2019-03-14 PROCEDURE — 97110 THERAPEUTIC EXERCISES: CPT

## 2019-03-14 NOTE — PROGRESS NOTES
Progress Summary  Patient Name: Jelena Momin, : 1955, MRN: [id number]   Date:  3/14/2019  Referring Physician:  Mayra Pineda    Diagnosis: E61.330V (ICD-10-CM) - 836.0 (ICD-9-CM) - Complex tear of medial meniscus of right knee as current 10  CANDDO gastroc stretches 3 x 30 secs  Lateral step-up 6\" with wt bearing  Heel raises 2 x 10  Partial squats 2 x 10 on BOSU                   Assessment:   Goals:   At 8 sessions  · Pt will improve knee extension ROM to 0- -2 deg to allow proper heel s

## 2019-03-26 ENCOUNTER — APPOINTMENT (OUTPATIENT)
Dept: PHYSICAL THERAPY | Facility: HOSPITAL | Age: 64
End: 2019-03-26
Attending: ORTHOPAEDIC SURGERY
Payer: OTHER MISCELLANEOUS

## 2019-03-28 ENCOUNTER — APPOINTMENT (OUTPATIENT)
Dept: PHYSICAL THERAPY | Facility: HOSPITAL | Age: 64
End: 2019-03-28
Attending: ORTHOPAEDIC SURGERY
Payer: OTHER MISCELLANEOUS

## 2019-04-02 ENCOUNTER — OFFICE VISIT (OUTPATIENT)
Dept: PHYSICAL THERAPY | Facility: HOSPITAL | Age: 64
End: 2019-04-02
Attending: ORTHOPAEDIC SURGERY
Payer: OTHER MISCELLANEOUS

## 2019-04-02 PROCEDURE — 97110 THERAPEUTIC EXERCISES: CPT

## 2019-04-02 NOTE — PROGRESS NOTES
Progress Summary  Patient Name: Rhoda Quinones, : 1955, MRN: [id number]   Date:  2019  Referring Physician:  Derrick Kim    Diagnosis: Z13.157W (ICD-10-CM) - 836.0 (ICD-9-CM) - Complex tear of medial meniscus of right knee as current 10  Partial squats 2 x 10 on BOSU     Bridge without ball 2 x 10  SAQ 6# 3 x 10  SLR 3 x 10 2.5#  CARLOS stretches 3 x 30 secs  Heel raises on edge of step 2 x 10  BOSU partial squats 2 x 10  Side leg lifts hip abd 2.5# 2 x 10  plyo back toss SLS   Chair sc min  Total Treatment Time: 55 min

## 2019-04-04 ENCOUNTER — OFFICE VISIT (OUTPATIENT)
Dept: PHYSICAL THERAPY | Facility: HOSPITAL | Age: 64
End: 2019-04-04
Attending: ORTHOPAEDIC SURGERY
Payer: OTHER MISCELLANEOUS

## 2019-04-04 PROCEDURE — 97110 THERAPEUTIC EXERCISES: CPT

## 2019-04-04 NOTE — PROGRESS NOTES
Progress Summary    Pt has attended , cancelled 0, and no shown 0 visits in Physical Therapy.      Progress Note Start Date: 19 End Date: 19    Patient Name: Lisa Saha, : 1955, MRN: [id number]   Date:  2019  Referring Phys 2.5# 2 x 10  plyo back toss SLS   Chair scoots 3  Down and back    Nu step mostly legs L-6, 10 x 8 mins   Bridge with ball 2 x 10  SAQ# 3 x 10  61/2#  SLR 3 x 10  2.5#  sideleg  3 x 10 lifts: 2.5#  Heel raises on edge of step  Magdaleno stretches 3 x 30 secs progress achieved in PT   MET  · Patient will RTW will satisfactory tolerance. Mostly MET    Plan:  TO follow up with Dr Luci Marroquin, goals mostly MET but stair descents still painful, has 4 more sessions approved and scheduled  Charges:  TherEx4       Tota

## 2019-04-09 ENCOUNTER — APPOINTMENT (OUTPATIENT)
Dept: PHYSICAL THERAPY | Facility: HOSPITAL | Age: 64
End: 2019-04-09
Attending: ORTHOPAEDIC SURGERY
Payer: OTHER MISCELLANEOUS

## 2019-04-11 ENCOUNTER — OFFICE VISIT (OUTPATIENT)
Dept: PHYSICAL THERAPY | Facility: HOSPITAL | Age: 64
End: 2019-04-11
Attending: ORTHOPAEDIC SURGERY
Payer: OTHER MISCELLANEOUS
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
## 2019-04-15 ENCOUNTER — NURSE TRIAGE (OUTPATIENT)
Dept: OTHER | Age: 64
End: 2019-04-15

## 2019-04-15 NOTE — TELEPHONE ENCOUNTER
Action Requested: Summary for Provider     []  Critical Lab, Recommendations Needed  [] Need Additional Advice  []   FYI    []   Need Orders  [] Need Medications Sent to Pharmacy  []  Other     SUMMARY:    The patient only wanted to see Dr. Bharati Tripp.   The michele

## 2019-04-16 ENCOUNTER — HOSPITAL ENCOUNTER (OUTPATIENT)
Dept: ULTRASOUND IMAGING | Facility: HOSPITAL | Age: 64
Discharge: HOME | End: 2019-04-16
Attending: INTERNAL MEDICINE
Payer: COMMERCIAL

## 2019-04-16 ENCOUNTER — OFFICE VISIT (OUTPATIENT)
Dept: INTERNAL MEDICINE CLINIC | Facility: CLINIC | Age: 64
End: 2019-04-16
Payer: COMMERCIAL

## 2019-04-16 VITALS
WEIGHT: 241.13 LBS | BODY MASS INDEX: 36.54 KG/M2 | HEIGHT: 68 IN | RESPIRATION RATE: 20 BRPM | HEART RATE: 90 BPM | SYSTOLIC BLOOD PRESSURE: 132 MMHG | DIASTOLIC BLOOD PRESSURE: 74 MMHG | TEMPERATURE: 101 F

## 2019-04-16 DIAGNOSIS — R60.0 LEG EDEMA, LEFT: ICD-10-CM
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
## 2019-04-16 DIAGNOSIS — S43.085D GRADE 3 SEPARATION OF LEFT SHOULDER, SUBSEQUENT ENCOUNTER: ICD-10-CM

## 2019-04-16 PROCEDURE — 99214 OFFICE O/P EST MOD 30 MIN: CPT | Performed by: INTERNAL MEDICINE

## 2019-04-16 PROCEDURE — 99212 OFFICE O/P EST SF 10 MIN: CPT | Performed by: INTERNAL MEDICINE

## 2019-04-16 PROCEDURE — 93971 EXTREMITY STUDY: CPT | Performed by: INTERNAL MEDICINE

## 2019-04-16 RX ORDER — TRAMADOL HYDROCHLORIDE 50 MG/1
TABLET ORAL
Refills: 0 | COMMUNITY
Start: 2019-04-14 | End: 2019-05-22 | Stop reason: ALTCHOICE

## 2019-04-16 RX ORDER — NAPROXEN 375 MG/1
TABLET ORAL
Refills: 0 | COMMUNITY
Start: 2019-04-08 | End: 2019-04-16
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
## 2019-04-18 ENCOUNTER — OFFICE VISIT (OUTPATIENT)
Dept: ORTHOPEDICS CLINIC | Facility: CLINIC | Age: 64
End: 2019-04-18
Payer: OTHER MISCELLANEOUS

## 2019-04-18 ENCOUNTER — OFFICE VISIT (OUTPATIENT)
Dept: PHYSICAL THERAPY | Facility: HOSPITAL | Age: 64
End: 2019-04-18
Attending: ORTHOPAEDIC SURGERY
Payer: OTHER MISCELLANEOUS

## 2019-04-18 DIAGNOSIS — M17.11 PRIMARY LOCALIZED OSTEOARTHROSIS OF RIGHT LOWER LEG: ICD-10-CM

## 2019-04-18 DIAGNOSIS — S83.231A COMPLEX TEAR OF MEDIAL MENISCUS OF RIGHT KNEE AS CURRENT INJURY, INITIAL ENCOUNTER: Primary | ICD-10-CM

## 2019-04-18 PROCEDURE — 99212 OFFICE O/P EST SF 10 MIN: CPT | Performed by: ORTHOPAEDIC SURGERY

## 2019-04-18 PROCEDURE — 99024 POSTOP FOLLOW-UP VISIT: CPT | Performed by: ORTHOPAEDIC SURGERY

## 2019-04-18 PROCEDURE — 97110 THERAPEUTIC EXERCISES: CPT

## 2019-04-18 NOTE — PROGRESS NOTES
Progress Note:     Pt has attended , cancelled 0, and no shown 0 visits in Physical Therapy.      Patient Name: Alissa Hargrove, : 1955, MRN: [id number]   Date:  2019  Referring Physician:  Adrien Ear    Diagnosis: P36.344J (IC 61/2#  SLR 3 x 10  2.5#  sideleg  3 x 10 lifts: 2.5#  Heel raises on edge of step  Mason stretches 3 x 30 secs  Plyo back toss SLS  Prone ham curls 6.5# 3 x 10  Lateral step ups 6\"  2 x 10  Heel raises on edge of step 2 x 10  Chair scoots 4 round trips  N · Pt will demonstrate increased hip ER/ABD strength to 4+/5 to perform stepping and squatting activities without excessive femoral IR/ADD    MET  · Pt will improve SLS to >30+s to improve safety and independence with gait on uneven surfaces such as grass

## 2019-04-18 NOTE — PROGRESS NOTES
NURSING INTAKE COMMENTS: Patient presents with:  Post-Op: F/u on right knee scope. Has been going to PT twice a week with improvement. Stts its still uncomfortable kneeling       Patient presents 3 months status post right knee scope.   Patient overall is appropriate. The operative extremity is neurovascularly intact with no focal deficits. Patient denies any calf pain. Imaging: none    Charleen Lawrence was seen today for post-op.     Diagnoses and all orders for this visit:    Complex tear of medial meniscus of

## 2019-04-23 ENCOUNTER — OFFICE VISIT (OUTPATIENT)
Dept: PHYSICAL THERAPY | Facility: HOSPITAL | Age: 64
End: 2019-04-23
Attending: ORTHOPAEDIC SURGERY
Payer: COMMERCIAL

## 2019-04-23 PROCEDURE — 97110 THERAPEUTIC EXERCISES: CPT

## 2019-04-23 NOTE — PROGRESS NOTES
Diagnosis: S83.231A (ICD-10-CM) - 836.0 (ICD-9-CM) - Complex tear of medial meniscus of right knee as current injury, initial encounter    Authorized # of Visits:  3 more sessions [facility])         Next MD visit:  5/18/19   Fall Risk: standard         Ingrid Frost 3 x 30 secs  Plyo back toss SLS  Prone ham curls 6.5# 3 x 10  Lateral step ups 6\"  2 x 10, 1 x 5  Heel raises on edge of step 2 x 10  Nu Step mostly legs L-6, 10x 8 mins      Hooklying clams with BK TB 3 x 10  SLR Bridge with ball 2 x 10  SAQ# 3 x 10 righ RTW will satisfactory tolerance. Mostly MET    Plan:  Continue with R LE strengthening for 2 sessions; anticipate discharge. Charges:  TherEx3       Total Timed Treatment: 43 min  Total Treatment Time: 45 min

## 2019-04-30 ENCOUNTER — OFFICE VISIT (OUTPATIENT)
Dept: PHYSICAL THERAPY | Facility: HOSPITAL | Age: 64
End: 2019-04-30
Attending: ORTHOPAEDIC SURGERY
Payer: COMMERCIAL

## 2019-04-30 PROCEDURE — 97110 THERAPEUTIC EXERCISES: CPT

## 2019-04-30 NOTE — PROGRESS NOTES
Diagnosis: S83.231A (ICD-10-CM) - 836.0 (ICD-9-CM) - Complex tear of medial meniscus of right knee as current injury, initial encounter    Authorized # of Visits:  3 more sessions [facility])         Next MD visit:  5/18/19   Fall Risk: standard         Barak Mayo with ball 2 x 10  SAQ# 3 x 10 right 7#  SLR 3 x 10  3#  BOSU partial squats bilateral 2 x 10 reps   CARLOS stretches for gastrocs  3 x 30 secs in standing  Heel raises on edge of step    Lateral step ups right 6\"  2 x 10,   A/P and lateral bilateral tilt b HEP to maintain progress achieved in PT   MET  · Patient will RTW will satisfactory tolerance. Mostly MET    Plan:  Continue with R LE strengthening for 2 sessions; anticipate discharge. Charges:  TherEx3       Total Timed Treatment: 43 min  Total Treat

## 2019-05-01 RX ORDER — AMLODIPINE BESYLATE 10 MG/1
TABLET ORAL
Qty: 90 TABLET | Refills: 1 | Status: SHIPPED | OUTPATIENT
Start: 2019-05-01 | End: 2019-10-23

## 2019-05-01 RX ORDER — LOSARTAN POTASSIUM AND HYDROCHLOROTHIAZIDE 12.5; 1 MG/1; MG/1
TABLET ORAL
Qty: 90 TABLET | Refills: 1 | Status: SHIPPED | OUTPATIENT
Start: 2019-05-01 | End: 2019-10-23

## 2019-05-07 ENCOUNTER — OFFICE VISIT (OUTPATIENT)
Dept: PHYSICAL THERAPY | Facility: HOSPITAL | Age: 64
End: 2019-05-07
Attending: ORTHOPAEDIC SURGERY
Payer: COMMERCIAL

## 2019-05-07 PROCEDURE — 97110 THERAPEUTIC EXERCISES: CPT

## 2019-05-07 NOTE — PROGRESS NOTES
Patient Name: Harshal Weller, : 1955, MRN: [id number]   Date:  2019  Referring Physician:  Sylvie Castañeda    Discharge Summary  Diagnosis: Y88.874X (ICD-10-CM) - 836.0 (ICD-9-CM) - Complex tear of medial meniscus of right knee as current · Pt will improve SLS to >30+s to improve safety and independence with gait on uneven surfaces such as grass   MET  · Pt will be independent and compliant with comprehensive HEP to maintain progress achieved in PT   MET  · Patient will RTW will satisfactor Nu Step mostly legs L-6, 10x 8 mins      Hooklying clams with BK TB 3 x 10  SLR Bridge with ball 2 x 10  SAQ# 3 x 10 right 7#  SLR 3 x 10  3#  BOSU partial squats bilateral 2 x 10 reps   CARLOS stretches for gastrocs  3 x 30 secs in standing  Heel raises on

## 2019-05-22 ENCOUNTER — OFFICE VISIT (OUTPATIENT)
Dept: ORTHOPEDICS CLINIC | Facility: CLINIC | Age: 64
End: 2019-05-22
Payer: OTHER MISCELLANEOUS

## 2019-05-22 DIAGNOSIS — S83.231A COMPLEX TEAR OF MEDIAL MENISCUS OF RIGHT KNEE AS CURRENT INJURY, INITIAL ENCOUNTER: Primary | ICD-10-CM

## 2019-05-22 PROCEDURE — 99212 OFFICE O/P EST SF 10 MIN: CPT | Performed by: ORTHOPAEDIC SURGERY

## 2019-05-22 PROCEDURE — 99213 OFFICE O/P EST LOW 20 MIN: CPT | Performed by: ORTHOPAEDIC SURGERY

## 2019-05-22 NOTE — PROGRESS NOTES
NURSING INTAKE COMMENTS: Patient presents with:  Post-Op: s/p right knee scope -- Finished PT about 2 weeks ago. Rates pain 0/10. Yoselin Pencil Denies any fever or calf pain. Patient presents 3 months status post right knee arthroscopy.   The patient reports no pa focal deficits. Patient denies any calf pain. Imaging: None today    Wade Acevedo was seen today for post-op.     Diagnoses and all orders for this visit:    Complex tear of medial meniscus of right knee as current injury, initial encounter        Plan: Misa

## 2019-07-31 RX ORDER — HYDRALAZINE HYDROCHLORIDE 50 MG/1
TABLET, FILM COATED ORAL
Qty: 60 TABLET | Refills: 1 | Status: SHIPPED | OUTPATIENT
Start: 2019-07-31 | End: 2019-12-30
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
## 2019-10-26 RX ORDER — AMLODIPINE BESYLATE 10 MG/1
TABLET ORAL
Qty: 90 TABLET | Refills: 0 | Status: SHIPPED | OUTPATIENT
Start: 2019-10-26 | End: 2020-01-23

## 2019-10-26 RX ORDER — LOSARTAN POTASSIUM AND HYDROCHLOROTHIAZIDE 12.5; 1 MG/1; MG/1
TABLET ORAL
Qty: 90 TABLET | Refills: 0 | Status: SHIPPED | OUTPATIENT
Start: 2019-10-26 | End: 2020-10-24

## 2019-10-28 ENCOUNTER — TELEPHONE (OUTPATIENT)
Dept: INTERNAL MEDICINE CLINIC | Facility: CLINIC | Age: 64
End: 2019-10-28

## 2019-10-28 RX ORDER — LOSARTAN POTASSIUM 100 MG/1
100 TABLET ORAL DAILY
Qty: 90 TABLET | Refills: 1 | Status: SHIPPED | OUTPATIENT
Start: 2019-10-28 | End: 2020-03-30

## 2019-10-28 RX ORDER — HYDROCHLOROTHIAZIDE 12.5 MG/1
12.5 TABLET ORAL DAILY
Qty: 90 TABLET | Refills: 1 | Status: SHIPPED | OUTPATIENT
Start: 2019-10-28 | End: 2020-03-30

## 2019-10-28 NOTE — TELEPHONE ENCOUNTER
LOSARTAN POTASSIUM-HCTZ 100-12.5 MG Oral Tab, TAKE 1 TABLET BY MOUTH EVERY DAY, Disp: 90 tablet, Rfl: 0      Medication is on backorder. Please send Rx for alternative.

## 2019-10-28 NOTE — TELEPHONE ENCOUNTER
Ludmilada First do you approve for the script to be sent .  If yes please see pended medication for your review and approval .Thanks

## 2019-11-01 NOTE — TELEPHONE ENCOUNTER
Please review, protocol failed.   Last refill 7/31/18  Due for labs    Requested Prescriptions     Pending Prescriptions Disp Refills   • HYDRALAZINE HCL 25 MG Oral Tab [Pharmacy Med Name: HYDRALAZINE  25MG TABLETS(ORANGE)] 90 tablet 0     Sig: TAKE 1 TABLE

## 2019-11-01 NOTE — TELEPHONE ENCOUNTER
Please review, protocol failed.   Last refill 10/4/18 quantity 60+5  Due for labs      Requested Prescriptions     Pending Prescriptions Disp Refills   • HYDRALAZINE HCL 25 MG Oral Tab [Pharmacy Med Name: HYDRALAZINE  25MG TABLETS(ORANGE)] 90 tablet 0     S

## 2019-11-01 NOTE — TELEPHONE ENCOUNTER
Please review, protocol failed.   Last refill 7/31/19 quantity 60+1  Need current labs    Requested Prescriptions     Pending Prescriptions Disp Refills   • HYDRALAZINE HCL 25 MG Oral Tab [Pharmacy Med Name: HYDRALAZINE  25MG TABLETS(ORANGE)] 30 tablet 0

## 2019-11-01 NOTE — TELEPHONE ENCOUNTER
Please contact both the patient and the pharmacy. Our records show that he is no longer taking either the methyldopa or the clonidine. Please sort this out.

## 2019-11-02 RX ORDER — METHYLDOPA 250 MG/1
TABLET, FILM COATED ORAL
Qty: 180 TABLET | Refills: 0 | OUTPATIENT
Start: 2019-11-02

## 2019-11-02 RX ORDER — HYDRALAZINE HYDROCHLORIDE 25 MG/1
TABLET, FILM COATED ORAL
Qty: 90 TABLET | Refills: 1 | Status: SHIPPED | OUTPATIENT
Start: 2019-11-02 | End: 2019-12-02 | Stop reason: ALTCHOICE

## 2019-11-02 RX ORDER — CLONIDINE HYDROCHLORIDE 0.1 MG/1
TABLET ORAL
Qty: 90 TABLET | Refills: 0 | OUTPATIENT
Start: 2019-11-02

## 2019-11-02 NOTE — TELEPHONE ENCOUNTER
Spoke with the patient who confirmed he is not taking clonidine or methyldopa. This nurse spoke with Daina Adams from [employer] [facility] [place] [phone] and informed her that the patient is no longer taking the clonidine or methyldopa. Daina Adams voiced understanding.

## 2019-12-02 ENCOUNTER — OFFICE VISIT (OUTPATIENT)
Dept: INTERNAL MEDICINE CLINIC | Facility: CLINIC | Age: 64
End: 2019-12-02
Payer: COMMERCIAL

## 2019-12-02 VITALS
HEART RATE: 80 BPM | BODY MASS INDEX: 34.07 KG/M2 | SYSTOLIC BLOOD PRESSURE: 140 MMHG | TEMPERATURE: 98 F | WEIGHT: 238 LBS | HEIGHT: 70 IN | DIASTOLIC BLOOD PRESSURE: 88 MMHG

## 2019-12-02 DIAGNOSIS — J02.9 SORE THROAT: Primary | ICD-10-CM

## 2019-12-02 DIAGNOSIS — Z23 NEED FOR VACCINATION: ICD-10-CM

## 2019-12-02 PROCEDURE — 99213 OFFICE O/P EST LOW 20 MIN: CPT | Performed by: NURSE PRACTITIONER

## 2019-12-02 PROCEDURE — 90686 IIV4 VACC NO PRSV 0.5 ML IM: CPT | Performed by: NURSE PRACTITIONER

## 2019-12-02 PROCEDURE — 87880 STREP A ASSAY W/OPTIC: CPT | Performed by: NURSE PRACTITIONER

## 2019-12-02 PROCEDURE — 90471 IMMUNIZATION ADMIN: CPT | Performed by: NURSE PRACTITIONER

## 2019-12-02 RX ORDER — LORATADINE 10 MG/1
10 TABLET ORAL DAILY
Qty: 30 TABLET | Refills: 11 | Status: SHIPPED | OUTPATIENT
Start: 2019-12-02 | End: 2020-09-21

## 2019-12-02 NOTE — PROGRESS NOTES
HPI:    Patient ID: Donnell Ruiz is a 60year old male. HPI patient here for laryngitis. This started a couple of weeks ago. Patient states he feels fine. Patient complains of laryngitis. His voice sounds hoarse. He has a sore throat.   Patient Edilma Geller Take 1 tablet (100 mg total) by mouth daily. 90 tablet 1   • hydrochlorothiazide 12.5 MG Oral Tab Take 1 tablet (12.5 mg total) by mouth daily.  90 tablet 1   • AMLODIPINE BESYLATE 10 MG Oral Tab TAKE 1 TABLET(10 MG) BY MOUTH DAILY 90 tablet 0   • HYDRALAZI other symptoms. On examination he does have fluid noted in ears no erythema. He does have some white spots on the back of his throat. Will perform rapid strep test.( Negative). It appears to be an upper respiratory viral infection.   If it  continues ma MG Oral Tab, Take 1 tablet (10 mg total) by mouth daily. losartan 100 MG Oral Tab, Take 1 tablet (100 mg total) by mouth daily. hydrochlorothiazide 12.5 MG Oral Tab, Take 1 tablet (12.5 mg total) by mouth daily.   AMLODIPINE BESYLATE 10 MG Oral Tab, TAKE

## 2019-12-02 NOTE — ASSESSMENT & PLAN NOTE
A/P 53-year-old male who complains of laryngitis for the past month. He denies any other symptoms. On examination he does have fluid noted in ears no erythema. He does have some white spots on the back of his throat.   Will perform rapid strep test.( Neg
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
## 2019-12-03 NOTE — PROGRESS NOTES
HPI:    Patient ID: Rhoda Quinones is a 60year old male. HPI  ***    Review of Systems         Current Outpatient Medications   Medication Sig Dispense Refill   • loratadine (CLARITIN) 10 MG Oral Tab Take 1 tablet (10 mg total) by mouth daily.  30 ta file.    Orders Placed This Encounter      Rapid Strep      Flulaval 6 months and older, Quadrivalent, Preservative Free [51602]      Grp A Strep Cult, Throat [E]      Meds This Visit:  Requested Prescriptions     Signed Prescriptions Disp Refills   • jessica

## 2019-12-30 ENCOUNTER — OFFICE VISIT (OUTPATIENT)
Dept: INTERNAL MEDICINE CLINIC | Facility: CLINIC | Age: 64
End: 2019-12-30
Payer: COMMERCIAL

## 2019-12-30 VITALS
HEIGHT: 70 IN | SYSTOLIC BLOOD PRESSURE: 146 MMHG | BODY MASS INDEX: 32.78 KG/M2 | RESPIRATION RATE: 20 BRPM | WEIGHT: 229 LBS | DIASTOLIC BLOOD PRESSURE: 70 MMHG | HEART RATE: 98 BPM | TEMPERATURE: 99 F

## 2019-12-30 DIAGNOSIS — J02.9 SORE THROAT: ICD-10-CM
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
## 2019-12-30 DIAGNOSIS — M54.2 NECK PAIN: ICD-10-CM

## 2019-12-30 DIAGNOSIS — I10 ESSENTIAL HYPERTENSION: Primary | ICD-10-CM

## 2019-12-30 PROCEDURE — 99214 OFFICE O/P EST MOD 30 MIN: CPT | Performed by: INTERNAL MEDICINE

## 2019-12-30 RX ORDER — HYDRALAZINE HYDROCHLORIDE 50 MG/1
50 TABLET, FILM COATED ORAL 2 TIMES DAILY
Qty: 180 TABLET | Refills: 1 | Status: SHIPPED | OUTPATIENT
Start: 2019-12-30 | End: 2020-06-24

## 2019-12-30 RX ORDER — FLUCONAZOLE 100 MG/1
TABLET ORAL
COMMUNITY
Start: 2019-12-19 | End: 2019-12-30 | Stop reason: ALTCHOICE
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
## 2020-01-09 NOTE — PROGRESS NOTES
Nursing Consultation Note  Patient: Emmanuel Lucio  YOB: 1955  Age: 60year old  Radiation Oncologist: Dr. Isiah Soliz  Referring Physician: Dr. Carina Flowers (PCP)  Diagnosis: LARYNGEAL CANCER  Consult Date: 1/10/2020      Chemo Psychiatric/Behavioral: Negative. Allergies:  No Known Allergies    Current Outpatient Medications   Medication Sig Dispense Refill   • cephALEXin 500 MG Oral Cap Take 500 mg by mouth.      • hydrALAzine HCl 50 MG Oral Tab Take 1 tablet (50 mg t resource strain: Not on file      Food insecurity:        Worry: Not on file        Inability: Not on file      Transportation needs:        Medical: Not on file        Non-medical: Not on file    Tobacco Use      Smoking status: Never Smoker      [place] directives. Transportation:  Adequate transportation available for expected visits    Pain:  Pain Loc: Throat(when talking); Pain Score: 2   ;    ;

## 2020-01-10 ENCOUNTER — TELEPHONE (OUTPATIENT)
Dept: RADIATION ONCOLOGY | Age: 65
End: 2020-01-10

## 2020-01-10 ENCOUNTER — HOSPITAL ENCOUNTER (OUTPATIENT)
Dept: RADIATION ONCOLOGY | Age: 65
Discharge: HOME | End: 2020-01-10
Attending: RADIOLOGY
Payer: COMMERCIAL

## 2020-01-10 ENCOUNTER — HOSPITAL ENCOUNTER (OUTPATIENT)
Dept: GENERAL RADIOLOGY | Age: 65
Discharge: HOME | End: 2020-01-10
Attending: RADIOLOGY
Payer: COMMERCIAL

## 2020-01-10 VITALS
WEIGHT: 233 LBS | TEMPERATURE: 99 F | BODY MASS INDEX: 33.36 KG/M2 | HEART RATE: 89 BPM | SYSTOLIC BLOOD PRESSURE: 165 MMHG | DIASTOLIC BLOOD PRESSURE: 74 MMHG | RESPIRATION RATE: 18 BRPM | HEIGHT: 70 IN | OXYGEN SATURATION: 96 %

## 2020-01-10 DIAGNOSIS — C32.9 LARYNGEAL CANCER (HCC): ICD-10-CM

## 2020-01-10 DIAGNOSIS — C32.9 LARYNGEAL CANCER (HCC): Primary | ICD-10-CM

## 2020-01-10 PROBLEM — C32.0 VOCAL CORD CANCER (HCC): Status: ACTIVE | Noted: 2020-01-10

## 2020-01-10 PROCEDURE — 71045 X-RAY EXAM CHEST 1 VIEW: CPT | Performed by: RADIOLOGY

## 2020-01-10 PROCEDURE — 99214 OFFICE O/P EST MOD 30 MIN: CPT

## 2020-01-10 RX ORDER — HYDROCODONE BITARTRATE AND ACETAMINOPHEN 5; 325 MG/1; MG/1
1 TABLET ORAL
COMMUNITY
Start: 2020-01-06 | End: 2020-09-21

## 2020-01-10 RX ORDER — CEPHALEXIN 500 MG/1
500 CAPSULE ORAL
COMMUNITY
Start: 2020-01-06 | End: 2020-02-24

## 2020-01-10 NOTE — PATIENT INSTRUCTIONS
- PLEASE CALL CENTRAL SCHEDULING AT [phone] TO SCHEDULE YOUR CT SOFT TISSUE OF NECK AND CHEST X-RAY.     - WE WILL CALL TO SCHEDULE YOUR CT SIMULATION/MAPPING AFTER YOUR SCAN.     - IF YOU HAVE ANY QUESTIONS OR CONCERNS REGARDING RADIATION THERAPY ,

## 2020-01-10 NOTE — CONSULTS
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 Eyes: Negative. Respiratory: Negative. Cardiovascular: Negative. Gastrointestinal: Negative. Endocrine: Negative. Genitourinary: Negative. Musculoskeletal: Negative. Skin: Negative. Allergic/Immunologic: Negative.     Raymond Aranda used smokeless tobacco. He reports that he does not drink alcohol or use drugs. PAST FAMILY HISTORY   family history is not on file.     Wt Readings from Last 6 Encounters:  01/10/20 : 105.7 kg (233 lb)  12/30/19 : 103.9 kg (229 lb)  12/02/19 : 108 kg (2 me directly. Patrick Boswell MD, [address] [place] [address] [email or web address]. [email or web address]  [phone]    1/10/2020        Imaging & Referral Orders:  CT SOFT TISSUE OF NECK(CONTRAST ONLY) (CPT=70491)       MRN: [id number]  Patient Name: Lexi AMARO  Birthdate: [id number]  Patient Name: Adam AMARO  YOB: 1955  Date: 01/06/2020    DATE OF OPERATION: 01/06/2020    He is a patient of the ENT service. DATE OF OPERATION:  01/06/2020. PREOPERATIVE DIAGNOSIS:  Bilateral vocal cord hyperkeratosis. Electronically signed by Leo Dejesus MD at 01/06/2020 11:34 AM CST    Back to top of Miscellaneous Notes  Operative Brief Note - Leo Dejesus MD - 01/06/2020 8:26 AM CST  Formatting of this note might be different from the original.  BRIEF POST PREM  YOB: 1955  Date: 01/06/2020    DATE OF ADMISSION: 01/06/2020    DATE OF SERVICE:  12/31/2019. He is a patient of the ENT service and Dr. Winston Fontana. DATE OF OPERATION:  01/06/2020.     HISTORY OF PRESENT ILLNESS:  This is a 61-year CARCINOMA  - SEE COMMENT    COMMENT  Case reviewed at departmental QA conference with consensus.           The attending pathologist whose signature appears on this report has reviewed   the diagnostic slides and has edited the  gross and/or microscopic one cassette(s).    Caitlyn Gonzalez 1/6/2020 02:39 PM

## 2020-01-14 ENCOUNTER — HOSPITAL ENCOUNTER (OUTPATIENT)
Dept: CT IMAGING | Facility: HOSPITAL | Age: 65
Discharge: HOME | End: 2020-01-14
Attending: RADIOLOGY
Payer: COMMERCIAL

## 2020-01-14 DIAGNOSIS — C32.9 LARYNGEAL CANCER (HCC): ICD-10-CM

## 2020-01-14 LAB — CREAT BLD-MCNC: 1.1 MG/DL (ref 0.7–1.3)

## 2020-01-14 PROCEDURE — 82565 ASSAY OF CREATININE: CPT

## 2020-01-14 PROCEDURE — 70491 CT SOFT TISSUE NECK W/DYE: CPT | Performed by: RADIOLOGY

## 2020-01-16 ENCOUNTER — APPOINTMENT (OUTPATIENT)
Dept: RADIATION ONCOLOGY | Facility: HOSPITAL | Age: 65
End: 2020-01-16
Attending: RADIOLOGY
Payer: COMMERCIAL

## 2020-01-16 PROCEDURE — 77334 RADIATION TREATMENT AID(S): CPT | Performed by: RADIOLOGY

## 2020-01-16 PROCEDURE — 77290 THER RAD SIMULAJ FIELD CPLX: CPT | Performed by: RADIOLOGY

## 2020-01-17 ENCOUNTER — APPOINTMENT (OUTPATIENT)
Dept: RADIATION ONCOLOGY | Facility: HOSPITAL | Age: 65
End: 2020-01-17
Attending: RADIOLOGY
Payer: COMMERCIAL

## 2020-01-20 PROCEDURE — 77295 3-D RADIOTHERAPY PLAN: CPT | Performed by: RADIOLOGY

## 2020-01-20 PROCEDURE — 77300 RADIATION THERAPY DOSE PLAN: CPT | Performed by: RADIOLOGY

## 2020-01-20 PROCEDURE — 77334 RADIATION TREATMENT AID(S): CPT | Performed by: RADIOLOGY

## 2020-01-23 RX ORDER — AMLODIPINE BESYLATE 10 MG/1
TABLET ORAL
Qty: 90 TABLET | Refills: 1 | Status: SHIPPED | OUTPATIENT
Start: 2020-01-23 | End: 2020-07-27

## 2020-01-23 NOTE — TELEPHONE ENCOUNTER
Requested Prescriptions     Pending Prescriptions Disp Refills   • amLODIPine Besylate 10 MG Oral Tab [Pharmacy Med Name: AMLODIPINE BESYLATE 10MG TABLETS] 90 tablet 1     Sig: TAKE ONE TABLET BY MOUTH DAILY         Recent Visits  Date Type Provider Dept

## 2020-01-29 PROCEDURE — 77280 THER RAD SIMULAJ FIELD SMPL: CPT | Performed by: RADIOLOGY

## 2020-01-29 PROCEDURE — 77412 RADIATION TX DELIVERY LVL 3: CPT | Performed by: RADIOLOGY

## 2020-01-30 ENCOUNTER — DIETICIAN VISIT (OUTPATIENT)
Dept: NUTRITION | Facility: HOSPITAL | Age: 65
End: 2020-01-30

## 2020-01-30 VITALS — WEIGHT: 236 LBS | BODY MASS INDEX: 34 KG/M2

## 2020-01-30 PROCEDURE — 77387 GUIDANCE FOR RADJ TX DLVR: CPT | Performed by: RADIOLOGY

## 2020-01-30 PROCEDURE — 77331 SPECIAL RADIATION DOSIMETRY: CPT | Performed by: RADIOLOGY

## 2020-01-30 PROCEDURE — 77412 RADIATION TX DELIVERY LVL 3: CPT | Performed by: RADIOLOGY

## 2020-01-30 NOTE — PROGRESS NOTES
Oncology Nutrition Assessment    Ht Readings from Last 1 Encounters:  01/10/20 : 177.8 cm (5' 10\")      Wt Readings from Last 1 Encounters:  01/30/20 : 107 kg (236 lb)    BMI Calculated: Body mass index is 33.86 kg/m². Weight History:   Wt Readings from

## 2020-01-31 PROCEDURE — 77412 RADIATION TX DELIVERY LVL 3: CPT | Performed by: RADIOLOGY

## 2020-01-31 PROCEDURE — 77387 GUIDANCE FOR RADJ TX DLVR: CPT | Performed by: RADIOLOGY

## 2020-02-01 ENCOUNTER — APPOINTMENT (OUTPATIENT)
Dept: RADIATION ONCOLOGY | Facility: HOSPITAL | Age: 65
End: 2020-02-01
Attending: RADIOLOGY
Payer: COMMERCIAL

## 2020-02-01 NOTE — TELEPHONE ENCOUNTER
16:55 TRANSFER - OUT REPORT:    Verbal report given to [facility] RN(name) on Eric Camejo  being transferred to [address] [place] [address] remote telemetry(unit) for routine progression of care       Report consisted of patients Situation, Background, Assessment and   Recommendations(SBAR). Information from the following report(s) SBAR was reviewed with the receiving nurse. Lines:   Peripheral IV 01/28/20 Distal;Left (Active)   Phlebitis Assessment 0 2/1/2020  8:22 AM   Infiltration Assessment 0 2/1/2020  8:22 AM   Dressing Status Clean, dry, & intact 2/1/2020  8:22 AM   Dressing Type Transparent 2/1/2020  8:22 AM   Hub Color/Line Status Capped;Flushed 2/1/2020  8:22 AM   Action Taken Open ports on tubing capped 2/1/2020  8:22 AM   Alcohol Cap Used Yes 2/1/2020  8:22 AM        Opportunity for questions and clarification was provided.       Patient transported with:   O2 @ 3 liters Called [facility] and s/w John Ladd and she states MRI was approved Micah Marsh #[id number] exp 9/30/17 at [address] [place] [address]. Called gifty RIVAS.

## 2020-02-03 ENCOUNTER — OFFICE VISIT (OUTPATIENT)
Dept: RADIATION ONCOLOGY | Facility: HOSPITAL | Age: 65
End: 2020-02-03
Attending: RADIOLOGY
Payer: COMMERCIAL

## 2020-02-03 VITALS
TEMPERATURE: 98 F | HEART RATE: 76 BPM | WEIGHT: 236.19 LBS | DIASTOLIC BLOOD PRESSURE: 82 MMHG | RESPIRATION RATE: 16 BRPM | OXYGEN SATURATION: 98 % | SYSTOLIC BLOOD PRESSURE: 174 MMHG | BODY MASS INDEX: 34 KG/M2

## 2020-02-03 PROCEDURE — 77412 RADIATION TX DELIVERY LVL 3: CPT | Performed by: RADIOLOGY

## 2020-02-03 PROCEDURE — 77387 GUIDANCE FOR RADJ TX DLVR: CPT | Performed by: RADIOLOGY

## 2020-02-03 NOTE — PROGRESS NOTES
[facility] Radiation Treatment Management Note 1-5    Patient:  Lisa Peterson  Age:  60year old  Visit Diagnosis:  No diagnosis found.   Primary Rad/Onc:  Dr. Nelia Baldwin Donis    Site Delivered Dose (Gy) Prescribed Dose (Gy) Fraction #

## 2020-02-04 PROCEDURE — 77412 RADIATION TX DELIVERY LVL 3: CPT | Performed by: RADIOLOGY

## 2020-02-04 PROCEDURE — 77387 GUIDANCE FOR RADJ TX DLVR: CPT | Performed by: RADIOLOGY

## 2020-02-05 PROCEDURE — 77412 RADIATION TX DELIVERY LVL 3: CPT | Performed by: RADIOLOGY

## 2020-02-05 PROCEDURE — 77387 GUIDANCE FOR RADJ TX DLVR: CPT | Performed by: RADIOLOGY

## 2020-02-06 ENCOUNTER — DIETICIAN VISIT (OUTPATIENT)
Dept: NUTRITION | Facility: HOSPITAL | Age: 65
End: 2020-02-06

## 2020-02-06 VITALS — BODY MASS INDEX: 34 KG/M2 | WEIGHT: 236 LBS

## 2020-02-06 PROCEDURE — 77412 RADIATION TX DELIVERY LVL 3: CPT | Performed by: RADIOLOGY

## 2020-02-06 PROCEDURE — 77387 GUIDANCE FOR RADJ TX DLVR: CPT | Performed by: RADIOLOGY

## 2020-02-06 NOTE — PROGRESS NOTES
Oncology Nutrition Assessment    Ht Readings from Last 1 Encounters:  01/10/20 : 177.8 cm (5' 10\")      Wt Readings from Last 1 Encounters:  02/06/20 : 107 kg (236 lb)    BMI Calculated: Body mass index is 33.86 kg/m². Weight History:   Wt Readings from needed.   [address] [place] [address], [facility]   Clinical Dietitian [id number]

## 2020-02-07 PROCEDURE — 77387 GUIDANCE FOR RADJ TX DLVR: CPT | Performed by: RADIOLOGY

## 2020-02-07 PROCEDURE — 77336 RADIATION PHYSICS CONSULT: CPT | Performed by: RADIOLOGY

## 2020-02-07 PROCEDURE — 77412 RADIATION TX DELIVERY LVL 3: CPT | Performed by: RADIOLOGY

## 2020-02-10 ENCOUNTER — OFFICE VISIT (OUTPATIENT)
Dept: RADIATION ONCOLOGY | Facility: HOSPITAL | Age: 65
End: 2020-02-10
Attending: RADIOLOGY
Payer: COMMERCIAL

## 2020-02-10 VITALS
DIASTOLIC BLOOD PRESSURE: 81 MMHG | SYSTOLIC BLOOD PRESSURE: 177 MMHG | BODY MASS INDEX: 34 KG/M2 | RESPIRATION RATE: 16 BRPM | WEIGHT: 236.63 LBS | TEMPERATURE: 98 F | OXYGEN SATURATION: 96 % | HEART RATE: 78 BPM

## 2020-02-10 PROCEDURE — 77387 GUIDANCE FOR RADJ TX DLVR: CPT | Performed by: RADIOLOGY

## 2020-02-10 PROCEDURE — 77412 RADIATION TX DELIVERY LVL 3: CPT | Performed by: RADIOLOGY

## 2020-02-10 NOTE — PROGRESS NOTES
[facility] Radiation Treatment Management Note 6-10    Patient:  Shirley Valentine  Age:  60year old  Visit Diagnosis:  No diagnosis found.   Primary Rad/Onc:  Dr. Manuela Slade Donis    Site Delivered Dose (Gy) Prescribed Dose (Gy) Fraction #

## 2020-02-11 ENCOUNTER — APPOINTMENT (OUTPATIENT)
Dept: RADIATION ONCOLOGY | Facility: HOSPITAL | Age: 65
End: 2020-02-11
Attending: RADIOLOGY
Payer: COMMERCIAL

## 2020-02-11 PROCEDURE — 77412 RADIATION TX DELIVERY LVL 3: CPT | Performed by: RADIOLOGY

## 2020-02-11 PROCEDURE — 77387 GUIDANCE FOR RADJ TX DLVR: CPT | Performed by: RADIOLOGY

## 2020-02-12 PROCEDURE — 77387 GUIDANCE FOR RADJ TX DLVR: CPT | Performed by: RADIOLOGY

## 2020-02-12 PROCEDURE — 77412 RADIATION TX DELIVERY LVL 3: CPT | Performed by: RADIOLOGY

## 2020-02-13 ENCOUNTER — DIETICIAN VISIT (OUTPATIENT)
Dept: NUTRITION | Facility: HOSPITAL | Age: 65
End: 2020-02-13

## 2020-02-13 VITALS — WEIGHT: 239.63 LBS | BODY MASS INDEX: 34 KG/M2

## 2020-02-13 PROCEDURE — 77387 GUIDANCE FOR RADJ TX DLVR: CPT | Performed by: RADIOLOGY

## 2020-02-13 PROCEDURE — 77412 RADIATION TX DELIVERY LVL 3: CPT | Performed by: RADIOLOGY

## 2020-02-13 NOTE — PROGRESS NOTES
Oncology Nutrition Assessment    Ht Readings from Last 1 Encounters:  01/10/20 : 177.8 cm (5' 10\")      Wt Readings from Last 1 Encounters:  02/13/20 : 108.7 kg (239 lb 9.6 oz)    BMI Calculated: Body mass index is 34.38 kg/m². Weight History:   Wt Readi additional rest if needed. CPM  2/13/20 239.6#--stable. Pt c/o's of sore throat. Asking if there is a mouth wash that he can use. Gave pt sample of biotene to try. Will leave note for RN to check with pt tomorrow.   Pt is using tylenol to take edge off of

## 2020-02-14 ENCOUNTER — TELEPHONE (OUTPATIENT)
Dept: RADIATION ONCOLOGY | Facility: HOSPITAL | Age: 65
End: 2020-02-14

## 2020-02-14 PROCEDURE — 77336 RADIATION PHYSICS CONSULT: CPT | Performed by: RADIOLOGY

## 2020-02-14 PROCEDURE — 77412 RADIATION TX DELIVERY LVL 3: CPT | Performed by: RADIOLOGY

## 2020-02-14 PROCEDURE — 77387 GUIDANCE FOR RADJ TX DLVR: CPT | Performed by: RADIOLOGY

## 2020-02-14 NOTE — TELEPHONE ENCOUNTER
Pt to take tylenol and advil around the clock and alternate per Dr. Susan Matthews.  He is aware if sore throat persist he can take [facility] [facility].

## 2020-02-17 ENCOUNTER — OFFICE VISIT (OUTPATIENT)
Dept: RADIATION ONCOLOGY | Facility: HOSPITAL | Age: 65
End: 2020-02-17
Attending: RADIOLOGY
Payer: COMMERCIAL

## 2020-02-17 VITALS
TEMPERATURE: 98 F | BODY MASS INDEX: 20 KG/M2 | HEART RATE: 73 BPM | OXYGEN SATURATION: 97 % | DIASTOLIC BLOOD PRESSURE: 75 MMHG | WEIGHT: 140.19 LBS | SYSTOLIC BLOOD PRESSURE: 157 MMHG | RESPIRATION RATE: 16 BRPM

## 2020-02-17 PROCEDURE — 77387 GUIDANCE FOR RADJ TX DLVR: CPT | Performed by: RADIOLOGY

## 2020-02-17 PROCEDURE — 77412 RADIATION TX DELIVERY LVL 3: CPT | Performed by: RADIOLOGY

## 2020-02-17 RX ORDER — IBUPROFEN 200 MG
400 TABLET ORAL EVERY 6 HOURS PRN
COMMUNITY
End: 2020-09-21

## 2020-02-17 RX ORDER — ACETAMINOPHEN 500 MG
1000 TABLET ORAL EVERY 6 HOURS PRN
COMMUNITY
End: 2020-09-21

## 2020-02-17 NOTE — PROGRESS NOTES
[facility] Radiation Treatment Management Note 11-15    Patient:  Herminio Reddy  Age:  60year old  Visit Diagnosis:  No diagnosis found.   Primary Rad/Onc:  Dr. Maurice Hernandez Donis    Site Delivered Dose (Gy) Prescribed Dose (Gy) Fraction #

## 2020-02-18 PROCEDURE — 77412 RADIATION TX DELIVERY LVL 3: CPT | Performed by: RADIOLOGY

## 2020-02-18 PROCEDURE — 77387 GUIDANCE FOR RADJ TX DLVR: CPT | Performed by: RADIOLOGY

## 2020-02-19 PROCEDURE — 77387 GUIDANCE FOR RADJ TX DLVR: CPT | Performed by: RADIOLOGY

## 2020-02-19 PROCEDURE — 77412 RADIATION TX DELIVERY LVL 3: CPT | Performed by: RADIOLOGY

## 2020-02-20 PROCEDURE — 77412 RADIATION TX DELIVERY LVL 3: CPT | Performed by: RADIOLOGY

## 2020-02-20 PROCEDURE — 77387 GUIDANCE FOR RADJ TX DLVR: CPT | Performed by: RADIOLOGY

## 2020-02-21 PROCEDURE — 77412 RADIATION TX DELIVERY LVL 3: CPT | Performed by: RADIOLOGY

## 2020-02-21 PROCEDURE — 77336 RADIATION PHYSICS CONSULT: CPT | Performed by: RADIOLOGY

## 2020-02-21 PROCEDURE — 77387 GUIDANCE FOR RADJ TX DLVR: CPT | Performed by: RADIOLOGY

## 2020-02-24 ENCOUNTER — OFFICE VISIT (OUTPATIENT)
Dept: RADIATION ONCOLOGY | Facility: HOSPITAL | Age: 65
End: 2020-02-24
Attending: RADIOLOGY
Payer: COMMERCIAL

## 2020-02-24 VITALS
HEART RATE: 74 BPM | WEIGHT: 242 LBS | TEMPERATURE: 98 F | OXYGEN SATURATION: 96 % | RESPIRATION RATE: 16 BRPM | SYSTOLIC BLOOD PRESSURE: 164 MMHG | BODY MASS INDEX: 35 KG/M2 | DIASTOLIC BLOOD PRESSURE: 80 MMHG

## 2020-02-24 DIAGNOSIS — C32.0 VOCAL CORD CANCER (HCC): Primary | ICD-10-CM

## 2020-02-24 PROCEDURE — 77412 RADIATION TX DELIVERY LVL 3: CPT | Performed by: RADIOLOGY

## 2020-02-24 PROCEDURE — 77387 GUIDANCE FOR RADJ TX DLVR: CPT | Performed by: RADIOLOGY

## 2020-02-24 RX ORDER — LIDOCAINE HYDROCHLORIDE 20 MG/ML
5 SOLUTION OROPHARYNGEAL
Qty: 100 ML | Refills: 3 | Status: SHIPPED | OUTPATIENT
Start: 2020-02-24 | End: 2020-09-21

## 2020-02-24 NOTE — PROGRESS NOTES
[facility] Radiation Treatment Management Note 16-20    Patient:  Edgardo Lane  Age:  60year old  Visit Diagnosis:  cT1a SCC of the L anterior TVC  Primary Rad/Onc:  Dr. Vannessa [facility] Donis    Site Delivered Dose (Gy) Prescribed Dose (Gy) moisturizer BID. -Pain: can cont Tylenol and ibuprofen prn, will send viscous lidocaine to pharmacy. Can mix 1:1 with Mylanta if needed. We discussed expected future toxicity. All questions answered.   Next OTV 1 week    Brinda Florentino MD

## 2020-02-25 PROCEDURE — 77412 RADIATION TX DELIVERY LVL 3: CPT | Performed by: RADIOLOGY

## 2020-02-25 PROCEDURE — 77387 GUIDANCE FOR RADJ TX DLVR: CPT | Performed by: RADIOLOGY

## 2020-02-26 PROCEDURE — 77387 GUIDANCE FOR RADJ TX DLVR: CPT | Performed by: RADIOLOGY

## 2020-02-26 PROCEDURE — 77412 RADIATION TX DELIVERY LVL 3: CPT | Performed by: RADIOLOGY

## 2020-02-27 ENCOUNTER — DIETICIAN VISIT (OUTPATIENT)
Dept: NUTRITION | Facility: HOSPITAL | Age: 65
End: 2020-02-27

## 2020-02-27 VITALS — WEIGHT: 242.38 LBS | BODY MASS INDEX: 35 KG/M2

## 2020-02-27 PROCEDURE — 77387 GUIDANCE FOR RADJ TX DLVR: CPT | Performed by: RADIOLOGY

## 2020-02-27 PROCEDURE — 77412 RADIATION TX DELIVERY LVL 3: CPT | Performed by: RADIOLOGY

## 2020-02-27 NOTE — PROGRESS NOTES
Oncology Nutrition Assessment    Ht Readings from Last 1 Encounters:  01/10/20 : 177.8 cm (5' 10\")      Wt Readings from Last 1 Encounters:  02/27/20 : 110 kg (242 lb 6.4 oz)    BMI Calculated: Body mass index is 34.78 kg/m². Weight History:   Wt Reading additional rest if needed. CPM  2/13/20 239.6#--stable. Pt c/o's of sore throat. Asking if there is a mouth wash that he can use. Gave pt sample of biotene to try. Will leave note for RN to check with pt tomorrow.   Pt is using tylenol to take edge off of

## 2020-02-28 PROCEDURE — 77336 RADIATION PHYSICS CONSULT: CPT | Performed by: RADIOLOGY

## 2020-02-28 PROCEDURE — 77387 GUIDANCE FOR RADJ TX DLVR: CPT | Performed by: RADIOLOGY

## 2020-02-28 PROCEDURE — 77412 RADIATION TX DELIVERY LVL 3: CPT | Performed by: RADIOLOGY

## 2020-03-01 ENCOUNTER — APPOINTMENT (OUTPATIENT)
Dept: RADIATION ONCOLOGY | Facility: HOSPITAL | Age: 65
End: 2020-03-01
Attending: RADIOLOGY
Payer: COMMERCIAL

## 2020-03-02 ENCOUNTER — OFFICE VISIT (OUTPATIENT)
Dept: RADIATION ONCOLOGY | Facility: HOSPITAL | Age: 65
End: 2020-03-02
Attending: RADIOLOGY
Payer: COMMERCIAL

## 2020-03-02 VITALS
OXYGEN SATURATION: 97 % | BODY MASS INDEX: 34 KG/M2 | SYSTOLIC BLOOD PRESSURE: 169 MMHG | TEMPERATURE: 98 F | RESPIRATION RATE: 16 BRPM | DIASTOLIC BLOOD PRESSURE: 81 MMHG | WEIGHT: 237.88 LBS | HEART RATE: 69 BPM

## 2020-03-02 PROCEDURE — 77387 GUIDANCE FOR RADJ TX DLVR: CPT | Performed by: RADIOLOGY

## 2020-03-02 PROCEDURE — 77412 RADIATION TX DELIVERY LVL 3: CPT | Performed by: RADIOLOGY

## 2020-03-02 NOTE — PROGRESS NOTES
[facility] Radiation Treatment Management Note 21-25    Patient:  Jenene Cabot  Age:  60year old  Visit Diagnosis:  No diagnosis found.   Primary Rad/Onc:  Dr. Elizabeth Costello Donis    Site Delivered Dose (Gy) Prescribed Dose (Gy) Fraction # week    Dr. Stephon Mckeon

## 2020-03-03 PROCEDURE — 77387 GUIDANCE FOR RADJ TX DLVR: CPT | Performed by: RADIOLOGY

## 2020-03-03 PROCEDURE — 77412 RADIATION TX DELIVERY LVL 3: CPT | Performed by: RADIOLOGY

## 2020-03-04 PROCEDURE — 77412 RADIATION TX DELIVERY LVL 3: CPT | Performed by: RADIOLOGY

## 2020-03-04 PROCEDURE — 77387 GUIDANCE FOR RADJ TX DLVR: CPT | Performed by: RADIOLOGY

## 2020-03-05 ENCOUNTER — DIETICIAN VISIT (OUTPATIENT)
Dept: NUTRITION | Facility: HOSPITAL | Age: 65
End: 2020-03-05

## 2020-03-05 VITALS — WEIGHT: 239.63 LBS | BODY MASS INDEX: 34 KG/M2

## 2020-03-05 PROCEDURE — 77387 GUIDANCE FOR RADJ TX DLVR: CPT | Performed by: RADIOLOGY

## 2020-03-05 PROCEDURE — 77412 RADIATION TX DELIVERY LVL 3: CPT | Performed by: RADIOLOGY

## 2020-03-05 NOTE — PROGRESS NOTES
Oncology Nutrition Assessment    Ht Readings from Last 1 Encounters:  01/10/20 : 177.8 cm (5' 10\")      Wt Readings from Last 1 Encounters:  03/05/20 : 108.7 kg (239 lb 9.6 oz)    BMI Calculated: Body mass index is 34.38 kg/m². Weight History:   Wt Readi fatigue--advise additional rest if needed. CPM  2/13/20 239.6#--stable. Pt c/o's of sore throat. Asking if there is a mouth wash that he can use. Gave pt sample of biotene to try. Will leave note for RN to check with pt tomorrow.   Pt is using tylenol to t

## 2020-03-06 PROCEDURE — 77412 RADIATION TX DELIVERY LVL 3: CPT | Performed by: RADIOLOGY

## 2020-03-06 PROCEDURE — 77336 RADIATION PHYSICS CONSULT: CPT | Performed by: RADIOLOGY

## 2020-03-06 PROCEDURE — 77387 GUIDANCE FOR RADJ TX DLVR: CPT | Performed by: RADIOLOGY

## 2020-03-09 ENCOUNTER — OFFICE VISIT (OUTPATIENT)
Dept: RADIATION ONCOLOGY | Facility: HOSPITAL | Age: 65
End: 2020-03-09
Attending: RADIOLOGY
Payer: COMMERCIAL

## 2020-03-09 VITALS
OXYGEN SATURATION: 96 % | RESPIRATION RATE: 16 BRPM | BODY MASS INDEX: 34 KG/M2 | HEART RATE: 63 BPM | TEMPERATURE: 98 F | WEIGHT: 240 LBS | SYSTOLIC BLOOD PRESSURE: 168 MMHG | DIASTOLIC BLOOD PRESSURE: 85 MMHG

## 2020-03-09 PROCEDURE — 77412 RADIATION TX DELIVERY LVL 3: CPT | Performed by: RADIOLOGY

## 2020-03-09 PROCEDURE — 77387 GUIDANCE FOR RADJ TX DLVR: CPT | Performed by: RADIOLOGY

## 2020-03-09 NOTE — PROGRESS NOTES
[facility] Radiation Treatment Management Note 26-30    Patient:  Bobo Irene  Age:  60year old  Visit Diagnosis:  No diagnosis found.   Primary Rad/Onc:  Dr. Brooke Mehta Donis    Site Delivered Dose (Gy) Prescribed Dose (Gy) Fraction #

## 2020-03-10 PROCEDURE — 77412 RADIATION TX DELIVERY LVL 3: CPT | Performed by: RADIOLOGY

## 2020-03-10 PROCEDURE — 77387 GUIDANCE FOR RADJ TX DLVR: CPT | Performed by: RADIOLOGY

## 2020-03-11 PROCEDURE — 77387 GUIDANCE FOR RADJ TX DLVR: CPT | Performed by: RADIOLOGY

## 2020-03-11 PROCEDURE — 77412 RADIATION TX DELIVERY LVL 3: CPT | Performed by: RADIOLOGY

## 2020-03-12 ENCOUNTER — DIETICIAN VISIT (OUTPATIENT)
Dept: NUTRITION | Facility: HOSPITAL | Age: 65
End: 2020-03-12

## 2020-03-12 VITALS — WEIGHT: 238.63 LBS | BODY MASS INDEX: 34 KG/M2

## 2020-03-12 PROCEDURE — 77412 RADIATION TX DELIVERY LVL 3: CPT | Performed by: RADIOLOGY

## 2020-03-12 PROCEDURE — 77387 GUIDANCE FOR RADJ TX DLVR: CPT | Performed by: RADIOLOGY

## 2020-03-12 NOTE — PROGRESS NOTES
Oncology Nutrition Assessment    Ht Readings from Last 1 Encounters:  01/10/20 : 177.8 cm (5' 10\")      Wt Readings from Last 1 Encounters:  03/12/20 : 108.2 kg (238 lb 9.6 oz)    BMI Calculated: Body mass index is 34.24 kg/m². Weight History:   Wt Readi fatigue--advise additional rest if needed. CPM  2/13/20 239.6#--stable. Pt c/o's of sore throat. Asking if there is a mouth wash that he can use. Gave pt sample of biotene to try. Will leave note for RN to check with pt tomorrow.   Pt is using tylenol to t

## 2020-03-13 PROCEDURE — 77336 RADIATION PHYSICS CONSULT: CPT | Performed by: RADIOLOGY

## 2020-03-13 PROCEDURE — 77387 GUIDANCE FOR RADJ TX DLVR: CPT | Performed by: RADIOLOGY

## 2020-03-13 PROCEDURE — 77412 RADIATION TX DELIVERY LVL 3: CPT | Performed by: RADIOLOGY

## 2020-03-13 NOTE — PROGRESS NOTES
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     Pink Overall

## 2020-03-30 RX ORDER — LOSARTAN POTASSIUM 100 MG/1
TABLET ORAL
Qty: 90 TABLET | Refills: 1 | Status: SHIPPED | OUTPATIENT
Start: 2020-03-30 | End: 2020-10-22

## 2020-03-30 RX ORDER — HYDROCHLOROTHIAZIDE 12.5 MG/1
TABLET ORAL
Qty: 90 TABLET | Refills: 1 | Status: SHIPPED | OUTPATIENT
Start: 2020-03-30 | End: 2020-10-22

## 2020-04-15 ENCOUNTER — APPOINTMENT (OUTPATIENT)
Dept: RADIATION ONCOLOGY | Facility: HOSPITAL | Age: 65
End: 2020-04-15
Attending: RADIOLOGY
Payer: COMMERCIAL

## 2020-06-04 ENCOUNTER — LAB ENCOUNTER (OUTPATIENT)
Dept: LAB | Facility: HOSPITAL | Age: 65
End: 2020-06-04
Attending: SPECIALIST
Payer: COMMERCIAL

## 2020-06-04 DIAGNOSIS — J38.3 DISORDER OF VOCAL CORD: Primary | ICD-10-CM

## 2020-06-04 PROCEDURE — 84443 ASSAY THYROID STIM HORMONE: CPT

## 2020-06-04 PROCEDURE — 36415 COLL VENOUS BLD VENIPUNCTURE: CPT

## 2020-06-24 RX ORDER — HYDRALAZINE HYDROCHLORIDE 50 MG/1
TABLET, FILM COATED ORAL
Qty: 180 TABLET | Refills: 1 | Status: SHIPPED | OUTPATIENT
Start: 2020-06-24 | End: 2021-01-11

## 2020-07-27 RX ORDER — AMLODIPINE BESYLATE 10 MG/1
TABLET ORAL
Qty: 90 TABLET | Refills: 1 | Status: SHIPPED | OUTPATIENT
Start: 2020-07-27 | End: 2020-10-22

## 2020-07-27 NOTE — TELEPHONE ENCOUNTER
Please schedule a phone visit, video visit or in person visit.     EVANS Menchaca  Wokring with Dr. Nadira Valdes

## 2020-07-28 NOTE — TELEPHONE ENCOUNTER
1st attempt called pt left message with son for pt to call back.  Sent pt mychart message advising to schedule an appt for further refills

## 2020-09-11 ENCOUNTER — LAB ENCOUNTER (OUTPATIENT)
Dept: LAB | Facility: HOSPITAL | Age: 65
End: 2020-09-11
Attending: INTERNAL MEDICINE
Payer: COMMERCIAL

## 2020-09-11 DIAGNOSIS — Z00.00 ROUTINE PHYSICAL EXAMINATION: ICD-10-CM

## 2020-09-11 LAB
ALBUMIN SERPL-MCNC: 4.1 G/DL (ref 3.4–5)
ALBUMIN/GLOB SERPL: 1.2 {RATIO} (ref 1–2)
ALP LIVER SERPL-CCNC: 96 U/L (ref 45–117)
ALT SERPL-CCNC: 34 U/L (ref 16–61)
ANION GAP SERPL CALC-SCNC: 8 MMOL/L (ref 0–18)
AST SERPL-CCNC: 21 U/L (ref 15–37)
BASOPHILS # BLD AUTO: 0.06 X10(3) UL (ref 0–0.2)
BASOPHILS NFR BLD AUTO: 0.8 %
BILIRUB SERPL-MCNC: 1 MG/DL (ref 0.1–2)
BUN BLD-MCNC: 19 MG/DL (ref 7–18)
BUN/CREAT SERPL: 21.3 (ref 10–20)
CALCIUM BLD-MCNC: 9.6 MG/DL (ref 8.5–10.1)
CHLORIDE SERPL-SCNC: 105 MMOL/L (ref 98–112)
CHOLEST SMN-MCNC: 167 MG/DL (ref ?–200)
CO2 SERPL-SCNC: 29 MMOL/L (ref 21–32)
COMPLEXED PSA SERPL-MCNC: 1.2 NG/ML (ref ?–4)
CREAT BLD-MCNC: 0.89 MG/DL (ref 0.7–1.3)
DEPRECATED RDW RBC AUTO: 41.4 FL (ref 35.1–46.3)
EOSINOPHIL # BLD AUTO: 0.24 X10(3) UL (ref 0–0.7)
EOSINOPHIL NFR BLD AUTO: 3.4 %
ERYTHROCYTE [DISTWIDTH] IN BLOOD BY AUTOMATED COUNT: 12.6 % (ref 11–15)
GLOBULIN PLAS-MCNC: 3.5 G/DL (ref 2.8–4.4)
GLUCOSE BLD-MCNC: 93 MG/DL (ref 70–99)
HCT VFR BLD AUTO: 47.7 % (ref 39–53)
HDLC SERPL-MCNC: 32 MG/DL (ref 40–59)
HGB BLD-MCNC: 16.5 G/DL (ref 13–17.5)
IMM GRANULOCYTES # BLD AUTO: 0.04 X10(3) UL (ref 0–1)
IMM GRANULOCYTES NFR BLD: 0.6 %
LDLC SERPL CALC-MCNC: 86 MG/DL (ref ?–100)
LYMPHOCYTES # BLD AUTO: 1.16 X10(3) UL (ref 1–4)
LYMPHOCYTES NFR BLD AUTO: 16.4 %
M PROTEIN MFR SERPL ELPH: 7.6 G/DL (ref 6.4–8.2)
MCH RBC QN AUTO: 30.9 PG (ref 26–34)
MCHC RBC AUTO-ENTMCNC: 34.6 G/DL (ref 31–37)
MCV RBC AUTO: 89.3 FL (ref 80–100)
MONOCYTES # BLD AUTO: 0.73 X10(3) UL (ref 0.1–1)
MONOCYTES NFR BLD AUTO: 10.3 %
NEUTROPHILS # BLD AUTO: 4.85 X10 (3) UL (ref 1.5–7.7)
NEUTROPHILS # BLD AUTO: 4.85 X10(3) UL (ref 1.5–7.7)
NEUTROPHILS NFR BLD AUTO: 68.5 %
NONHDLC SERPL-MCNC: 135 MG/DL (ref ?–130)
OSMOLALITY SERPL CALC.SUM OF ELEC: 296 MOSM/KG (ref 275–295)
PATIENT FASTING Y/N/NP: YES
PATIENT FASTING Y/N/NP: YES
PLATELET # BLD AUTO: 194 10(3)UL (ref 150–450)
POTASSIUM SERPL-SCNC: 4.1 MMOL/L (ref 3.5–5.1)
RBC # BLD AUTO: 5.34 X10(6)UL (ref 4.3–5.7)
SODIUM SERPL-SCNC: 142 MMOL/L (ref 136–145)
TRIGL SERPL-MCNC: 246 MG/DL (ref 30–149)
TSI SER-ACNC: 3.17 MIU/ML (ref 0.36–3.74)
VLDLC SERPL CALC-MCNC: 49 MG/DL (ref 0–30)
WBC # BLD AUTO: 7.1 X10(3) UL (ref 4–11)

## 2020-09-11 PROCEDURE — 80061 LIPID PANEL: CPT

## 2020-09-11 PROCEDURE — 85025 COMPLETE CBC W/AUTO DIFF WBC: CPT

## 2020-09-11 PROCEDURE — 84443 ASSAY THYROID STIM HORMONE: CPT

## 2020-09-11 PROCEDURE — 80053 COMPREHEN METABOLIC PANEL: CPT

## 2020-09-11 PROCEDURE — 36415 COLL VENOUS BLD VENIPUNCTURE: CPT

## 2020-09-21 ENCOUNTER — OFFICE VISIT (OUTPATIENT)
Dept: INTERNAL MEDICINE CLINIC | Facility: CLINIC | Age: 65
End: 2020-09-21
Payer: COMMERCIAL

## 2020-09-21 VITALS
DIASTOLIC BLOOD PRESSURE: 84 MMHG | WEIGHT: 248 LBS | SYSTOLIC BLOOD PRESSURE: 160 MMHG | BODY MASS INDEX: 36.73 KG/M2 | RESPIRATION RATE: 18 BRPM | HEIGHT: 69 IN | HEART RATE: 77 BPM

## 2020-09-21 DIAGNOSIS — K42.9 UMBILICAL HERNIA WITHOUT OBSTRUCTION AND WITHOUT GANGRENE: ICD-10-CM

## 2020-09-21 DIAGNOSIS — I10 ESSENTIAL HYPERTENSION: ICD-10-CM

## 2020-09-21 DIAGNOSIS — M25.552 BILATERAL HIP PAIN: ICD-10-CM

## 2020-09-21 DIAGNOSIS — C32.0 VOCAL CORD CANCER (HCC): ICD-10-CM

## 2020-09-21 DIAGNOSIS — M25.551 BILATERAL HIP PAIN: ICD-10-CM

## 2020-09-21 DIAGNOSIS — I65.29 CAROTID ATHEROSCLEROSIS, UNSPECIFIED LATERALITY: ICD-10-CM

## 2020-09-21 DIAGNOSIS — Z23 NEED FOR VACCINATION: ICD-10-CM

## 2020-09-21 DIAGNOSIS — Z00.00 ROUTINE PHYSICAL EXAMINATION: Primary | ICD-10-CM

## 2020-09-21 PROCEDURE — 99396 PREV VISIT EST AGE 40-64: CPT | Performed by: INTERNAL MEDICINE

## 2020-09-21 PROCEDURE — 90471 IMMUNIZATION ADMIN: CPT | Performed by: INTERNAL MEDICINE

## 2020-09-21 PROCEDURE — 3079F DIAST BP 80-89 MM HG: CPT | Performed by: INTERNAL MEDICINE

## 2020-09-21 PROCEDURE — 90686 IIV4 VACC NO PRSV 0.5 ML IM: CPT | Performed by: INTERNAL MEDICINE

## 2020-09-21 PROCEDURE — 3077F SYST BP >= 140 MM HG: CPT | Performed by: INTERNAL MEDICINE

## 2020-09-21 PROCEDURE — 3008F BODY MASS INDEX DOCD: CPT | Performed by: INTERNAL MEDICINE

## 2020-09-21 NOTE — PROGRESS NOTES
HPI:    Patient ID: Rain Izaguirre is a 63year old male. HPI  Patient is here requesting a physical exam and follow-up on chronic medical issues as listed below. Last seen here in late December.   At that time increased hydralazine from once a day t drinks    Drug use: No         Review of Systems   Constitutional: Negative for chills, fatigue and fever. HENT: Negative for hearing loss. Eyes: Negative for visual disturbance. Respiratory: Negative for cough and shortness of breath.     Cardiovasc heard.  Bilateral lower extremity varicose veins    Edema not present. Carotid bruit not present. Pulmonary/Chest: Effort normal and breath sounds normal. He has no wheezes. He has no rales. Abdominal: Soft. Bowel sounds are normal. He exhibits no mass. pressure. 3. Vocal cord cancer (HCC)  Status post surgery and radiation. Follow-up with ENT doctors. No evidence of recurrence at this time. 4. Bilateral hip pain  Patient complains of bilateral hip pain.   Refer to physical therapy.  - PHYSICAL THE

## 2020-10-14 ENCOUNTER — OFFICE VISIT (OUTPATIENT)
Dept: SURGERY | Facility: CLINIC | Age: 65
End: 2020-10-14
Payer: COMMERCIAL

## 2020-10-14 VITALS — HEIGHT: 69 IN | BODY MASS INDEX: 36.73 KG/M2 | WEIGHT: 248 LBS

## 2020-10-14 DIAGNOSIS — K42.9 UMBILICAL HERNIA WITHOUT OBSTRUCTION AND WITHOUT GANGRENE: Primary | ICD-10-CM

## 2020-10-14 PROCEDURE — 99244 OFF/OP CNSLTJ NEW/EST MOD 40: CPT | Performed by: SURGERY

## 2020-10-14 PROCEDURE — 3008F BODY MASS INDEX DOCD: CPT | Performed by: SURGERY

## 2020-10-16 ENCOUNTER — HOSPITAL ENCOUNTER (OUTPATIENT)
Dept: ULTRASOUND IMAGING | Facility: HOSPITAL | Age: 65
Discharge: HOME | End: 2020-10-16
Attending: INTERNAL MEDICINE
Payer: COMMERCIAL

## 2020-10-16 DIAGNOSIS — I65.29 CAROTID ATHEROSCLEROSIS, UNSPECIFIED LATERALITY: ICD-10-CM

## 2020-10-16 PROCEDURE — 93880 EXTRACRANIAL BILAT STUDY: CPT | Performed by: INTERNAL MEDICINE

## 2020-10-16 NOTE — H&P (VIEW-ONLY)
History and Physical      Creola Cheadle is a 63year old male. HPI   Patient presents with:  Hernia: Pt referred by Dr. Jimmy Momin for umbilical hernia since 0111. Pt c/o some discomfort. Pt denies c/o fever, dif. w/ bm's or urination.         HPI 3      Years of education: Not on file      Highest education level: Not on file    Occupational History      Occupation: heavy     Tobacco Use      Smoking status: Never Smoker      Smokeless tobacco: Never Used    Substance and Sexual Activity benefits of proceeding or delaying treatment/procedure/surgery including the risks of COVID-19 exposure and infection which may lead to significant complications, morbidity and mortality including, but not limited to, quarantine/self-isolation, additional

## 2020-10-16 NOTE — H&P
History and Physical      Sharla Leger is a 63year old male. HPI   Patient presents with:  Hernia: Pt referred by Dr. Daiana Stokes for umbilical hernia since 1804. Pt c/o some discomfort. Pt denies c/o fever, dif. w/ bm's or urination.         HPI 3      Years of education: Not on file      Highest education level: Not on file    Occupational History      Occupation: heavy     Tobacco Use      Smoking status: Never Smoker      Smokeless tobacco: Never Used    Substance and Sexual Activity benefits of proceeding or delaying treatment/procedure/surgery including the risks of COVID-19 exposure and infection which may lead to significant complications, morbidity and mortality including, but not limited to, quarantine/self-isolation, additional

## 2020-10-19 ENCOUNTER — TELEPHONE (OUTPATIENT)
Dept: SURGERY | Facility: CLINIC | Age: 65
End: 2020-10-19

## 2020-10-19 NOTE — TELEPHONE ENCOUNTER
Pt came by to drop off his FMLA papers . He wants to make sure that his Employer and his Union are informed . HAIR was signed and $25 was paid.  Sent to forms and placed in HAIR @ [facility]

## 2020-10-22 ENCOUNTER — MED REC SCAN ONLY (OUTPATIENT)
Dept: INTERNAL MEDICINE CLINIC | Facility: CLINIC | Age: 65
End: 2020-10-22

## 2020-10-22 RX ORDER — HYDROCHLOROTHIAZIDE 12.5 MG/1
TABLET ORAL
Qty: 90 TABLET | Refills: 1 | Status: SHIPPED | OUTPATIENT
Start: 2020-10-22 | End: 2021-01-11

## 2020-10-22 RX ORDER — LOSARTAN POTASSIUM 100 MG/1
TABLET ORAL
Qty: 90 TABLET | Refills: 1 | Status: SHIPPED | OUTPATIENT
Start: 2020-10-22 | End: 2021-01-11

## 2020-10-22 RX ORDER — AMLODIPINE BESYLATE 10 MG/1
TABLET ORAL
Qty: 90 TABLET | Refills: 1 | Status: SHIPPED | OUTPATIENT
Start: 2020-10-22 | End: 2021-01-11

## 2020-10-24 ENCOUNTER — APPOINTMENT (OUTPATIENT)
Dept: LAB | Facility: HOSPITAL | Age: 65
End: 2020-10-24
Attending: SURGERY
Payer: COMMERCIAL

## 2020-10-24 DIAGNOSIS — Z01.818 PREOP TESTING: ICD-10-CM

## 2020-10-26 NOTE — TELEPHONE ENCOUNTER
Dr. Madelaine Romo,     Please sign off on form: FMLA 1-4 weeks    -Highlight the patient and hit \"Chart\" button.   -In Chart Review, w/in the Encounter tab - click 1 time on the Telephone call encounter for 10/19/20 Scroll down the telephone encounter.  -Click \

## 2020-10-27 ENCOUNTER — ANESTHESIA (OUTPATIENT)
Dept: SURGERY | Facility: HOSPITAL | Age: 65
End: 2020-10-27
Payer: COMMERCIAL

## 2020-10-27 ENCOUNTER — HOSPITAL ENCOUNTER (OUTPATIENT)
Facility: HOSPITAL | Age: 65
Setting detail: HOSPITAL OUTPATIENT SURGERY
Discharge: HOME | End: 2020-10-27
Attending: SURGERY | Admitting: SURGERY
Payer: COMMERCIAL

## 2020-10-27 ENCOUNTER — ANESTHESIA EVENT (OUTPATIENT)
Dept: SURGERY | Facility: HOSPITAL | Age: 65
End: 2020-10-27
Payer: COMMERCIAL

## 2020-10-27 VITALS
HEART RATE: 76 BPM | WEIGHT: 239 LBS | HEIGHT: 70 IN | TEMPERATURE: 98 F | RESPIRATION RATE: 16 BRPM | OXYGEN SATURATION: 94 % | DIASTOLIC BLOOD PRESSURE: 60 MMHG | SYSTOLIC BLOOD PRESSURE: 133 MMHG | BODY MASS INDEX: 34.22 KG/M2

## 2020-10-27 DIAGNOSIS — Z01.818 PREOP TESTING: Primary | ICD-10-CM

## 2020-10-27 DIAGNOSIS — K42.9 UMBILICAL HERNIA WITHOUT OBSTRUCTION AND WITHOUT GANGRENE: ICD-10-CM

## 2020-10-27 PROCEDURE — 49587 REPAIR UMBILICAL HERN,5+Y/O,STRANG: CPT | Performed by: SURGERY

## 2020-10-27 PROCEDURE — 0WQF0ZZ REPAIR ABDOMINAL WALL, OPEN APPROACH: ICD-10-PCS | Performed by: SURGERY

## 2020-10-27 RX ORDER — BUPIVACAINE HYDROCHLORIDE AND EPINEPHRINE 5; 5 MG/ML; UG/ML
INJECTION, SOLUTION PERINEURAL AS NEEDED
Status: DISCONTINUED | OUTPATIENT
Start: 2020-10-27 | End: 2020-10-27 | Stop reason: HOSPADM

## 2020-10-27 RX ORDER — MIDAZOLAM HYDROCHLORIDE 1 MG/ML
INJECTION INTRAMUSCULAR; INTRAVENOUS AS NEEDED
Status: DISCONTINUED | OUTPATIENT
Start: 2020-10-27 | End: 2020-10-27 | Stop reason: SURG

## 2020-10-27 RX ORDER — MORPHINE SULFATE 4 MG/ML
2 INJECTION, SOLUTION INTRAMUSCULAR; INTRAVENOUS EVERY 10 MIN PRN
Status: DISCONTINUED | OUTPATIENT
Start: 2020-10-27 | End: 2020-10-27

## 2020-10-27 RX ORDER — FAMOTIDINE 20 MG/1
20 TABLET ORAL ONCE
Status: COMPLETED | OUTPATIENT
Start: 2020-10-27 | End: 2020-10-27

## 2020-10-27 RX ORDER — MORPHINE SULFATE 4 MG/ML
4 INJECTION, SOLUTION INTRAMUSCULAR; INTRAVENOUS EVERY 10 MIN PRN
Status: DISCONTINUED | OUTPATIENT
Start: 2020-10-27 | End: 2020-10-27

## 2020-10-27 RX ORDER — MAGNESIUM HYDROXIDE 1200 MG/15ML
LIQUID ORAL CONTINUOUS PRN
Status: COMPLETED | OUTPATIENT
Start: 2020-10-27 | End: 2020-10-27

## 2020-10-27 RX ORDER — DEXAMETHASONE SODIUM PHOSPHATE 4 MG/ML
VIAL (ML) INJECTION AS NEEDED
Status: DISCONTINUED | OUTPATIENT
Start: 2020-10-27 | End: 2020-10-27 | Stop reason: SURG

## 2020-10-27 RX ORDER — EPHEDRINE SULFATE 50 MG/ML
INJECTION, SOLUTION INTRAVENOUS AS NEEDED
Status: DISCONTINUED | OUTPATIENT
Start: 2020-10-27 | End: 2020-10-27 | Stop reason: SURG

## 2020-10-27 RX ORDER — CEFAZOLIN SODIUM/WATER 2 G/20 ML
2 SYRINGE (ML) INTRAVENOUS ONCE
Status: COMPLETED | OUTPATIENT
Start: 2020-10-27 | End: 2020-10-27

## 2020-10-27 RX ORDER — HALOPERIDOL 5 MG/ML
0.25 INJECTION INTRAMUSCULAR ONCE AS NEEDED
Status: DISCONTINUED | OUTPATIENT
Start: 2020-10-27 | End: 2020-10-27

## 2020-10-27 RX ORDER — ONDANSETRON 2 MG/ML
4 INJECTION INTRAMUSCULAR; INTRAVENOUS ONCE AS NEEDED
Status: DISCONTINUED | OUTPATIENT
Start: 2020-10-27 | End: 2020-10-27

## 2020-10-27 RX ORDER — HYDROMORPHONE HYDROCHLORIDE 1 MG/ML
0.6 INJECTION, SOLUTION INTRAMUSCULAR; INTRAVENOUS; SUBCUTANEOUS EVERY 5 MIN PRN
Status: DISCONTINUED | OUTPATIENT
Start: 2020-10-27 | End: 2020-10-27

## 2020-10-27 RX ORDER — ROCURONIUM BROMIDE 10 MG/ML
INJECTION, SOLUTION INTRAVENOUS AS NEEDED
Status: DISCONTINUED | OUTPATIENT
Start: 2020-10-27 | End: 2020-10-27 | Stop reason: SURG

## 2020-10-27 RX ORDER — NEOSTIGMINE METHYLSULFATE 1 MG/ML
INJECTION INTRAVENOUS AS NEEDED
Status: DISCONTINUED | OUTPATIENT
Start: 2020-10-27 | End: 2020-10-27 | Stop reason: SURG

## 2020-10-27 RX ORDER — HYDROMORPHONE HYDROCHLORIDE 1 MG/ML
0.4 INJECTION, SOLUTION INTRAMUSCULAR; INTRAVENOUS; SUBCUTANEOUS EVERY 5 MIN PRN
Status: DISCONTINUED | OUTPATIENT
Start: 2020-10-27 | End: 2020-10-27

## 2020-10-27 RX ORDER — METOCLOPRAMIDE 10 MG/1
10 TABLET ORAL ONCE
Status: COMPLETED | OUTPATIENT
Start: 2020-10-27 | End: 2020-10-27

## 2020-10-27 RX ORDER — PROCHLORPERAZINE EDISYLATE 5 MG/ML
5 INJECTION INTRAMUSCULAR; INTRAVENOUS ONCE AS NEEDED
Status: DISCONTINUED | OUTPATIENT
Start: 2020-10-27 | End: 2020-10-27

## 2020-10-27 RX ORDER — HYDROCODONE BITARTRATE AND ACETAMINOPHEN 5; 325 MG/1; MG/1
1 TABLET ORAL EVERY 6 HOURS PRN
Qty: 20 TABLET | Refills: 0 | Status: SHIPPED | OUTPATIENT
Start: 2020-10-27 | End: 2021-01-11

## 2020-10-27 RX ORDER — ACETAMINOPHEN 500 MG
1000 TABLET ORAL ONCE
Status: COMPLETED | OUTPATIENT
Start: 2020-10-27 | End: 2020-10-27

## 2020-10-27 RX ORDER — NALOXONE HYDROCHLORIDE 0.4 MG/ML
80 INJECTION, SOLUTION INTRAMUSCULAR; INTRAVENOUS; SUBCUTANEOUS AS NEEDED
Status: DISCONTINUED | OUTPATIENT
Start: 2020-10-27 | End: 2020-10-27

## 2020-10-27 RX ORDER — SODIUM CHLORIDE, SODIUM LACTATE, POTASSIUM CHLORIDE, CALCIUM CHLORIDE 600; 310; 30; 20 MG/100ML; MG/100ML; MG/100ML; MG/100ML
INJECTION, SOLUTION INTRAVENOUS CONTINUOUS
Status: DISCONTINUED | OUTPATIENT
Start: 2020-10-27 | End: 2020-10-27

## 2020-10-27 RX ORDER — HYDROCODONE BITARTRATE AND ACETAMINOPHEN 5; 325 MG/1; MG/1
2 TABLET ORAL AS NEEDED
Status: DISCONTINUED | OUTPATIENT
Start: 2020-10-27 | End: 2020-10-27

## 2020-10-27 RX ORDER — HYDROCODONE BITARTRATE AND ACETAMINOPHEN 5; 325 MG/1; MG/1
1 TABLET ORAL AS NEEDED
Status: DISCONTINUED | OUTPATIENT
Start: 2020-10-27 | End: 2020-10-27

## 2020-10-27 RX ORDER — HYDROMORPHONE HYDROCHLORIDE 1 MG/ML
0.2 INJECTION, SOLUTION INTRAMUSCULAR; INTRAVENOUS; SUBCUTANEOUS EVERY 5 MIN PRN
Status: DISCONTINUED | OUTPATIENT
Start: 2020-10-27 | End: 2020-10-27

## 2020-10-27 RX ORDER — ONDANSETRON 2 MG/ML
INJECTION INTRAMUSCULAR; INTRAVENOUS AS NEEDED
Status: DISCONTINUED | OUTPATIENT
Start: 2020-10-27 | End: 2020-10-27 | Stop reason: SURG

## 2020-10-27 RX ORDER — GLYCOPYRROLATE 0.2 MG/ML
INJECTION, SOLUTION INTRAMUSCULAR; INTRAVENOUS AS NEEDED
Status: DISCONTINUED | OUTPATIENT
Start: 2020-10-27 | End: 2020-10-27 | Stop reason: SURG

## 2020-10-27 RX ORDER — MORPHINE SULFATE 10 MG/ML
6 INJECTION, SOLUTION INTRAMUSCULAR; INTRAVENOUS EVERY 10 MIN PRN
Status: DISCONTINUED | OUTPATIENT
Start: 2020-10-27 | End: 2020-10-27

## 2020-10-27 RX ADMIN — DEXAMETHASONE SODIUM PHOSPHATE 8 MG: 4 MG/ML VIAL (ML) INJECTION at 08:45:00

## 2020-10-27 RX ADMIN — ROCURONIUM BROMIDE 30 MG: 10 INJECTION, SOLUTION INTRAVENOUS at 08:45:00

## 2020-10-27 RX ADMIN — EPHEDRINE SULFATE 5 MG: 50 INJECTION, SOLUTION INTRAVENOUS at 08:58:00

## 2020-10-27 RX ADMIN — SODIUM CHLORIDE, SODIUM LACTATE, POTASSIUM CHLORIDE, CALCIUM CHLORIDE: 600; 310; 30; 20 INJECTION, SOLUTION INTRAVENOUS at 09:25:00

## 2020-10-27 RX ADMIN — EPHEDRINE SULFATE 5 MG: 50 INJECTION, SOLUTION INTRAVENOUS at 09:09:00

## 2020-10-27 RX ADMIN — SODIUM CHLORIDE, SODIUM LACTATE, POTASSIUM CHLORIDE, CALCIUM CHLORIDE: 600; 310; 30; 20 INJECTION, SOLUTION INTRAVENOUS at 08:12:00

## 2020-10-27 RX ADMIN — NEOSTIGMINE METHYLSULFATE 5 MG: 1 INJECTION INTRAVENOUS at 09:19:00

## 2020-10-27 RX ADMIN — EPHEDRINE SULFATE 5 MG: 50 INJECTION, SOLUTION INTRAVENOUS at 08:55:00

## 2020-10-27 RX ADMIN — CEFAZOLIN SODIUM/WATER 2 G: 2 G/20 ML SYRINGE (ML) INTRAVENOUS at 08:39:00

## 2020-10-27 RX ADMIN — MIDAZOLAM HYDROCHLORIDE 2 MG: 1 INJECTION INTRAMUSCULAR; INTRAVENOUS at 08:28:00

## 2020-10-27 RX ADMIN — GLYCOPYRROLATE 0.8 MG: 0.2 INJECTION, SOLUTION INTRAMUSCULAR; INTRAVENOUS at 09:19:00

## 2020-10-27 RX ADMIN — ONDANSETRON 4 MG: 2 INJECTION INTRAMUSCULAR; INTRAVENOUS at 09:11:00

## 2020-10-27 RX ADMIN — EPHEDRINE SULFATE 5 MG: 50 INJECTION, SOLUTION INTRAVENOUS at 09:04:00

## 2020-10-27 NOTE — ANESTHESIA PROCEDURE NOTES
Airway  Date/Time: 10/27/2020 8:37 AM  Urgency: elective    Difficult airway    General Information and Staff    Patient location during procedure: OR  Anesthesiologist: Mandy Lam MD  Performed: anesthesiologist     Indications and Patient Condition  In

## 2020-10-27 NOTE — BRIEF OP NOTE
Pre-Operative Diagnosis: Umbilical hernia without obstruction and without gangrene [K42.9]     Post-Operative Diagnosis: Umbilical hernia without obstruction and without gangrene [K42.9]      Procedure Performed:   Procedure(s):  incarcerated umbilical her

## 2020-10-27 NOTE — ANESTHESIA POSTPROCEDURE EVALUATION
Patient: Lexi Alejandro    Procedure Summary     Date: 10/27/20 Room / Location: [address] [place] [address] MAIN OR 03 / [facility] MAIN OR    Anesthesia Start: 2850 Anesthesia Stop:     Procedure:  HERNIA UMBILICAL REPAIR ADULT (N/A Abdomen) Diagnosis:       Umbilical hernia without

## 2020-10-27 NOTE — ANESTHESIA PREPROCEDURE EVALUATION
Anesthesia PreOp Note    HPI:     Lisa Saha is a 63year old male who presents for preoperative consultation requested by: Kendrick Valdez MD    Date of Surgery: 10/27/2020    Procedure(s):   HERNIA UMBILICAL REPAIR ADULT  Indication: Umbilical herni •  lactated ringers infusion, , Intravenous, Continuous, Ayaka Braraza MD, Last Rate: 20 mL/hr at 10/27/20 8956    •  ceFAZolin sodium (ANCEF/KEFZOL) 2 GM/20ML premix IV syringe 2 g, 2 g, Intravenous, Once, Ayaka Barraza MD    No current Epic-ordered o Component Value Date    WBC 7.1 09/11/2020    RBC 5.34 09/11/2020    HGB 16.5 09/11/2020    HCT 47.7 09/11/2020    MCV 89.3 09/11/2020    MCH 30.9 09/11/2020    MCHC 34.6 09/11/2020    RDW 12.6 09/11/2020    .0 09/11/2020     Lab Results   Component I have informed Jason Re and/or legal guardian or family member of the nature of the anesthetic plan, benefits, risks including possible dental damage if relevant, major complications, and any alternative forms of anesthetic management.    All of t

## 2020-10-27 NOTE — INTERVAL H&P NOTE
Pre-op Diagnosis: Umbilical hernia without obstruction and without gangrene [K42.9]    The above referenced H&P was reviewed by Bolivar Osuna MD on 10/27/2020, the patient was examined and no significant changes have occurred in the patient's condition sin

## 2020-10-28 NOTE — OPERATIVE REPORT
[facility]    PATIENT'S NAME: Sarahdede Lan   ATTENDING PHYSICIAN: Jackie Gonzalez MD   OPERATING PHYSICIAN: Jackie Gonzalez MD   PATIENT ACCOUNT#:   [id number]    LOCATION:  [place] [address]  MEDICAL RECORD #:   [id number]       TS suture. Adequate hemostasis was noted. The umbilical skin was tacked back down to the fascia with 3-0 Vicryl suture. The surgical wound was closed with 3-0 and 4-0 Vicryl suture. Steri-Strips, gauze, and Tegaderm dressing were applied.   Overall, the pa

## 2020-10-30 ENCOUNTER — TELEPHONE (OUTPATIENT)
Dept: SURGERY | Facility: CLINIC | Age: 65
End: 2020-10-30

## 2020-10-30 NOTE — TELEPHONE ENCOUNTER
Patient came to the [facility] to drop off STD forms for [facility], forms forwarded via email to HAIR dept & dropped off.       please fax#  [phone]

## 2020-11-09 ENCOUNTER — OFFICE VISIT (OUTPATIENT)
Dept: INTERNAL MEDICINE CLINIC | Facility: CLINIC | Age: 65
End: 2020-11-09
Payer: COMMERCIAL

## 2020-11-09 VITALS
DIASTOLIC BLOOD PRESSURE: 73 MMHG | SYSTOLIC BLOOD PRESSURE: 136 MMHG | RESPIRATION RATE: 20 BRPM | WEIGHT: 240 LBS | HEART RATE: 76 BPM | HEIGHT: 69 IN | BODY MASS INDEX: 35.55 KG/M2

## 2020-11-09 DIAGNOSIS — C32.0 VOCAL CORD CANCER (HCC): ICD-10-CM

## 2020-11-09 DIAGNOSIS — I10 WHITE COAT SYNDROME WITH HYPERTENSION: ICD-10-CM

## 2020-11-09 DIAGNOSIS — K42.9 UMBILICAL HERNIA WITHOUT OBSTRUCTION AND WITHOUT GANGRENE: ICD-10-CM

## 2020-11-09 DIAGNOSIS — I10 ESSENTIAL HYPERTENSION: Primary | ICD-10-CM

## 2020-11-09 PROCEDURE — 99072 ADDL SUPL MATRL&STAF TM PHE: CPT | Performed by: INTERNAL MEDICINE

## 2020-11-09 PROCEDURE — 99213 OFFICE O/P EST LOW 20 MIN: CPT | Performed by: INTERNAL MEDICINE

## 2020-11-09 PROCEDURE — 3075F SYST BP GE 130 - 139MM HG: CPT | Performed by: INTERNAL MEDICINE

## 2020-11-09 PROCEDURE — 3008F BODY MASS INDEX DOCD: CPT | Performed by: INTERNAL MEDICINE

## 2020-11-09 PROCEDURE — 3078F DIAST BP <80 MM HG: CPT | Performed by: INTERNAL MEDICINE

## 2020-11-09 NOTE — PROGRESS NOTES
HPI:    Patient ID: Lexi Alejandro is a 63year old male. HPI  Patient is here for follow-up on hypertension. Last seen here in September. Blood pressure elevated at that time. Some element of whitecoat hypertension. No changes made.   Since that palpitations. Gastrointestinal: Negative for nausea, vomiting, abdominal pain, diarrhea and constipation. Genitourinary: Negative for dysuria, hematuria and sexual dysfunction. Musculoskeletal: Negative for joint pain. Skin: Negative for rash.    Ne patient's blood pressure cuff. Continue same medications. He has had issues with various types of medications previously. Work on diet and exercise. Try to lose more weight. Weight is down about 8 or 9 pounds. Follow-up in 6 months.     2. White coat

## 2020-11-10 NOTE — TELEPHONE ENCOUNTER
Dr. Miriam Brewer,     Please sign off on form: [employer]  -Highlight the patient and hit \"Chart\" button.   -In Chart Review, w/in the Encounter tab - click 1 time on the Telephone call encounter for 10/19/2020 Scroll down the telephone encounter.  -Click \"scan on\

## 2020-11-11 ENCOUNTER — OFFICE VISIT (OUTPATIENT)
Dept: SURGERY | Facility: CLINIC | Age: 65
End: 2020-11-11
Payer: COMMERCIAL

## 2020-11-11 DIAGNOSIS — K42.0 UMBILICAL HERNIA WITH OBSTRUCTION, WITHOUT GANGRENE: Primary | ICD-10-CM

## 2020-11-11 PROCEDURE — 99024 POSTOP FOLLOW-UP VISIT: CPT | Performed by: SURGERY

## 2020-11-12 NOTE — TELEPHONE ENCOUNTER
Forms Completed and faxed to [employer] [facility] [address] [place] [address] to [phone]. Sent [address].

## 2020-11-12 NOTE — PROGRESS NOTES
Postoperative Patient Follow-up      11/11/2020    Lydia Hare 63year old      HPI  Patient presents with:  Post-Op: s/p incarcerated umbilical hernia repair on 10/27/2020.  Pt denies pain (little tender at times) denies fever, bowel/ bladder problem

## 2020-12-11 ENCOUNTER — LAB ENCOUNTER (OUTPATIENT)
Dept: LAB | Facility: HOSPITAL | Age: 65
End: 2020-12-11
Attending: SPECIALIST
Payer: COMMERCIAL

## 2020-12-11 DIAGNOSIS — J38.3 OTHER DISEASES OF VOCAL CORDS: Primary | ICD-10-CM

## 2020-12-11 PROCEDURE — 84443 ASSAY THYROID STIM HORMONE: CPT

## 2020-12-11 PROCEDURE — 36415 COLL VENOUS BLD VENIPUNCTURE: CPT

## 2021-01-09 ENCOUNTER — LAB ENCOUNTER (OUTPATIENT)
Dept: LAB | Facility: HOSPITAL | Age: 66
End: 2021-01-09
Attending: ORTHOPAEDIC SURGERY
Payer: COMMERCIAL

## 2021-01-09 DIAGNOSIS — S46.012A STRAIN OF TENDON OF LEFT ROTATOR CUFF: ICD-10-CM

## 2021-01-09 DIAGNOSIS — S43.102A: Primary | ICD-10-CM

## 2021-01-09 DIAGNOSIS — S41.002A: Primary | ICD-10-CM

## 2021-01-09 LAB
ALBUMIN SERPL-MCNC: 4.1 G/DL (ref 3.4–5)
ALBUMIN/GLOB SERPL: 1.2 {RATIO} (ref 1–2)
ALP LIVER SERPL-CCNC: 100 U/L
ALT SERPL-CCNC: 34 U/L
ANION GAP SERPL CALC-SCNC: 4 MMOL/L (ref 0–18)
APTT PPP: 33.6 SECONDS (ref 23.2–35.3)
AST SERPL-CCNC: 19 U/L (ref 15–37)
BASOPHILS # BLD AUTO: 0.04 X10(3) UL (ref 0–0.2)
BASOPHILS NFR BLD AUTO: 0.5 %
BILIRUB SERPL-MCNC: 0.9 MG/DL (ref 0.1–2)
BUN BLD-MCNC: 20 MG/DL (ref 7–18)
BUN/CREAT SERPL: 20.2 (ref 10–20)
CALCIUM BLD-MCNC: 9.3 MG/DL (ref 8.5–10.1)
CHLORIDE SERPL-SCNC: 107 MMOL/L (ref 98–112)
CO2 SERPL-SCNC: 30 MMOL/L (ref 21–32)
CREAT BLD-MCNC: 0.99 MG/DL
DEPRECATED RDW RBC AUTO: 39.8 FL (ref 35.1–46.3)
EOSINOPHIL # BLD AUTO: 0.17 X10(3) UL (ref 0–0.7)
EOSINOPHIL NFR BLD AUTO: 2.3 %
ERYTHROCYTE [DISTWIDTH] IN BLOOD BY AUTOMATED COUNT: 12.2 % (ref 11–15)
GLOBULIN PLAS-MCNC: 3.5 G/DL (ref 2.8–4.4)
GLUCOSE BLD-MCNC: 84 MG/DL (ref 70–99)
HCT VFR BLD AUTO: 48.3 %
HGB BLD-MCNC: 16.6 G/DL
IMM GRANULOCYTES # BLD AUTO: 0.02 X10(3) UL (ref 0–1)
IMM GRANULOCYTES NFR BLD: 0.3 %
INR BLD: 1.08 (ref 0.9–1.2)
LYMPHOCYTES # BLD AUTO: 1.49 X10(3) UL (ref 1–4)
LYMPHOCYTES NFR BLD AUTO: 20.1 %
M PROTEIN MFR SERPL ELPH: 7.6 G/DL (ref 6.4–8.2)
MCH RBC QN AUTO: 30.2 PG (ref 26–34)
MCHC RBC AUTO-ENTMCNC: 34.4 G/DL (ref 31–37)
MCV RBC AUTO: 87.8 FL
MONOCYTES # BLD AUTO: 0.68 X10(3) UL (ref 0.1–1)
MONOCYTES NFR BLD AUTO: 9.2 %
NEUTROPHILS # BLD AUTO: 5.01 X10 (3) UL (ref 1.5–7.7)
NEUTROPHILS # BLD AUTO: 5.01 X10(3) UL (ref 1.5–7.7)
NEUTROPHILS NFR BLD AUTO: 67.6 %
OSMOLALITY SERPL CALC.SUM OF ELEC: 294 MOSM/KG (ref 275–295)
PATIENT FASTING Y/N/NP: YES
PLATELET # BLD AUTO: 200 10(3)UL (ref 150–450)
POTASSIUM SERPL-SCNC: 3.7 MMOL/L (ref 3.5–5.1)
PROTHROMBIN TIME: 13.8 SECONDS (ref 11.8–14.5)
RBC # BLD AUTO: 5.5 X10(6)UL
SODIUM SERPL-SCNC: 141 MMOL/L (ref 136–145)
WBC # BLD AUTO: 7.4 X10(3) UL (ref 4–11)

## 2021-01-09 PROCEDURE — 36415 COLL VENOUS BLD VENIPUNCTURE: CPT

## 2021-01-09 PROCEDURE — 85730 THROMBOPLASTIN TIME PARTIAL: CPT

## 2021-01-09 PROCEDURE — 85610 PROTHROMBIN TIME: CPT

## 2021-01-09 PROCEDURE — 93010 ELECTROCARDIOGRAM REPORT: CPT | Performed by: ORTHOPAEDIC SURGERY

## 2021-01-09 PROCEDURE — 93005 ELECTROCARDIOGRAM TRACING: CPT

## 2021-01-09 PROCEDURE — 85025 COMPLETE CBC W/AUTO DIFF WBC: CPT

## 2021-01-09 PROCEDURE — 80053 COMPREHEN METABOLIC PANEL: CPT

## 2021-01-11 ENCOUNTER — OFFICE VISIT (OUTPATIENT)
Dept: INTERNAL MEDICINE CLINIC | Facility: CLINIC | Age: 66
End: 2021-01-11
Payer: COMMERCIAL

## 2021-01-11 VITALS
WEIGHT: 246 LBS | RESPIRATION RATE: 20 BRPM | DIASTOLIC BLOOD PRESSURE: 86 MMHG | SYSTOLIC BLOOD PRESSURE: 156 MMHG | BODY MASS INDEX: 36.43 KG/M2 | HEIGHT: 69 IN | HEART RATE: 70 BPM

## 2021-01-11 DIAGNOSIS — I10 ESSENTIAL HYPERTENSION: ICD-10-CM

## 2021-01-11 DIAGNOSIS — S43.005D DISLOCATION OF LEFT SHOULDER JOINT, SUBSEQUENT ENCOUNTER: ICD-10-CM

## 2021-01-11 DIAGNOSIS — Z01.818 PRE-OP EXAMINATION: Primary | ICD-10-CM

## 2021-01-11 DIAGNOSIS — C32.0 VOCAL CORD CANCER (HCC): ICD-10-CM

## 2021-01-11 DIAGNOSIS — K42.9 UMBILICAL HERNIA WITHOUT OBSTRUCTION AND WITHOUT GANGRENE: ICD-10-CM

## 2021-01-11 PROCEDURE — 3077F SYST BP >= 140 MM HG: CPT | Performed by: INTERNAL MEDICINE

## 2021-01-11 PROCEDURE — 99214 OFFICE O/P EST MOD 30 MIN: CPT | Performed by: INTERNAL MEDICINE

## 2021-01-11 PROCEDURE — 3008F BODY MASS INDEX DOCD: CPT | Performed by: INTERNAL MEDICINE

## 2021-01-11 PROCEDURE — 3079F DIAST BP 80-89 MM HG: CPT | Performed by: INTERNAL MEDICINE

## 2021-01-11 RX ORDER — HYDROCHLOROTHIAZIDE 25 MG/1
12.5 TABLET ORAL DAILY
Qty: 90 TABLET | Refills: 1 | Status: SHIPPED | OUTPATIENT
Start: 2021-01-11 | End: 2021-01-11

## 2021-01-11 RX ORDER — LOSARTAN POTASSIUM 100 MG/1
100 TABLET ORAL DAILY
Qty: 90 TABLET | Refills: 1 | Status: SHIPPED | OUTPATIENT
Start: 2021-01-11 | End: 2021-07-07

## 2021-01-11 RX ORDER — HYDROCHLOROTHIAZIDE 25 MG/1
25 TABLET ORAL DAILY
Qty: 90 TABLET | Refills: 1 | Status: SHIPPED | OUTPATIENT
Start: 2021-01-11 | End: 2021-07-07

## 2021-01-11 RX ORDER — AMLODIPINE BESYLATE 10 MG/1
10 TABLET ORAL DAILY
Qty: 90 TABLET | Refills: 1 | Status: SHIPPED | OUTPATIENT
Start: 2021-01-11 | End: 2021-07-07

## 2021-01-11 RX ORDER — HYDRALAZINE HYDROCHLORIDE 50 MG/1
50 TABLET, FILM COATED ORAL 2 TIMES DAILY
Qty: 180 TABLET | Refills: 1 | Status: SHIPPED | OUTPATIENT
Start: 2021-01-11 | End: 2021-07-07

## 2021-01-11 NOTE — PROGRESS NOTES
HPI:    Patient ID: Randal Ryan is a 63year old male. HPI  Patient is here for preoperative evaluation. On January 25 he is scheduled to undergo left shoulder open distal clavicle resection under the guidance of Dr. Wes Clayton.   This is to tr History    Tobacco Use      Smoking status: Never Smoker      Smokeless tobacco: Never Used    Alcohol use: No      Alcohol/week: 0.0 standard drinks    Drug use: No         Review of Systems   Constitutional: Negative for chills, fatigue and fever.    CAROLYN Edema not present. Pulmonary/Chest: Effort normal and breath sounds normal. He has no wheezes. He has no rales. Abdominal: Soft. Bowel sounds are normal. He exhibits no mass. There is no abdominal tenderness.  Musculoskeletal: He exhibits no tenderness gangrene  Went through recent surgical repair without difficulty. 5. Vocal cord cancer [facility])  He is now about 1 year out of treatment including surgery and radiation. He is doing well with that although still has side effects from the radiation.

## 2021-01-18 ENCOUNTER — OFFICE VISIT (OUTPATIENT)
Dept: INTERNAL MEDICINE CLINIC | Facility: CLINIC | Age: 66
End: 2021-01-18
Payer: COMMERCIAL

## 2021-01-18 VITALS
SYSTOLIC BLOOD PRESSURE: 157 MMHG | HEIGHT: 69 IN | HEART RATE: 73 BPM | WEIGHT: 243.31 LBS | BODY MASS INDEX: 36.04 KG/M2 | DIASTOLIC BLOOD PRESSURE: 84 MMHG

## 2021-01-18 DIAGNOSIS — I10 ESSENTIAL HYPERTENSION: Primary | ICD-10-CM

## 2021-01-18 PROCEDURE — 3077F SYST BP >= 140 MM HG: CPT | Performed by: INTERNAL MEDICINE

## 2021-01-18 PROCEDURE — 3079F DIAST BP 80-89 MM HG: CPT | Performed by: INTERNAL MEDICINE

## 2021-01-18 PROCEDURE — 3008F BODY MASS INDEX DOCD: CPT | Performed by: INTERNAL MEDICINE

## 2021-01-18 PROCEDURE — 99212 OFFICE O/P EST SF 10 MIN: CPT | Performed by: INTERNAL MEDICINE

## 2021-01-18 NOTE — PROGRESS NOTES
HPI:    Patient ID: Lisa Peterson is a 63year old male. HPI  Patient is here to follow-up on hypertension and to get final clearance prior to undergoing shoulder surgery. He was seen here 1 week ago. At that time blood pressure 160/80.   Preoperat by mouth daily. 90 tablet 1   • hydrALAzine HCl 50 MG Oral Tab Take 1 tablet (50 mg total) by mouth 2 (two) times daily. 180 tablet 1   • amLODIPine Besylate 10 MG Oral Tab Take 1 tablet (10 mg total) by mouth daily.  90 tablet 1   • hydrochlorothiazide 25

## 2021-03-20 DIAGNOSIS — Z23 NEED FOR VACCINATION: ICD-10-CM

## 2021-07-07 RX ORDER — HYDROCHLOROTHIAZIDE 25 MG/1
TABLET ORAL
Qty: 90 TABLET | Refills: 0 | Status: SHIPPED | OUTPATIENT
Start: 2021-07-07 | End: 2021-09-20

## 2021-07-07 RX ORDER — HYDRALAZINE HYDROCHLORIDE 50 MG/1
TABLET, FILM COATED ORAL
Qty: 180 TABLET | Refills: 0 | Status: SHIPPED | OUTPATIENT
Start: 2021-07-07 | End: 2021-09-20

## 2021-07-07 RX ORDER — AMLODIPINE BESYLATE 10 MG/1
TABLET ORAL
Qty: 90 TABLET | Refills: 0 | Status: SHIPPED | OUTPATIENT
Start: 2021-07-07 | End: 2021-09-20

## 2021-07-07 RX ORDER — LOSARTAN POTASSIUM 100 MG/1
TABLET ORAL
Qty: 90 TABLET | Refills: 0 | Status: SHIPPED | OUTPATIENT
Start: 2021-07-07 | End: 2021-09-20

## 2021-09-20 ENCOUNTER — OFFICE VISIT (OUTPATIENT)
Dept: INTERNAL MEDICINE CLINIC | Facility: CLINIC | Age: 66
End: 2021-09-20
Payer: COMMERCIAL

## 2021-09-20 VITALS
HEIGHT: 70 IN | RESPIRATION RATE: 20 BRPM | WEIGHT: 240 LBS | BODY MASS INDEX: 34.36 KG/M2 | DIASTOLIC BLOOD PRESSURE: 76 MMHG | TEMPERATURE: 98 F | SYSTOLIC BLOOD PRESSURE: 134 MMHG | HEART RATE: 66 BPM

## 2021-09-20 DIAGNOSIS — Z12.5 SCREENING FOR PROSTATE CANCER: ICD-10-CM

## 2021-09-20 DIAGNOSIS — Z23 NEED FOR VACCINATION: ICD-10-CM

## 2021-09-20 DIAGNOSIS — I10 ESSENTIAL HYPERTENSION: Primary | ICD-10-CM

## 2021-09-20 DIAGNOSIS — I10 WHITE COAT SYNDROME WITH HYPERTENSION: ICD-10-CM

## 2021-09-20 DIAGNOSIS — C32.0 VOCAL CORD CANCER (HCC): ICD-10-CM

## 2021-09-20 PROCEDURE — 3008F BODY MASS INDEX DOCD: CPT | Performed by: INTERNAL MEDICINE

## 2021-09-20 PROCEDURE — 3078F DIAST BP <80 MM HG: CPT | Performed by: INTERNAL MEDICINE

## 2021-09-20 PROCEDURE — 3075F SYST BP GE 130 - 139MM HG: CPT | Performed by: INTERNAL MEDICINE

## 2021-09-20 PROCEDURE — 99214 OFFICE O/P EST MOD 30 MIN: CPT | Performed by: INTERNAL MEDICINE

## 2021-09-20 PROCEDURE — 90750 HZV VACC RECOMBINANT IM: CPT | Performed by: INTERNAL MEDICINE

## 2021-09-20 PROCEDURE — 90471 IMMUNIZATION ADMIN: CPT | Performed by: INTERNAL MEDICINE

## 2021-09-20 RX ORDER — AMLODIPINE BESYLATE 10 MG/1
10 TABLET ORAL DAILY
Qty: 90 TABLET | Refills: 1 | Status: SHIPPED | OUTPATIENT
Start: 2021-09-20

## 2021-09-20 RX ORDER — LOSARTAN POTASSIUM 100 MG/1
100 TABLET ORAL DAILY
Qty: 90 TABLET | Refills: 1 | Status: SHIPPED | OUTPATIENT
Start: 2021-09-20

## 2021-09-20 RX ORDER — HYDRALAZINE HYDROCHLORIDE 50 MG/1
50 TABLET, FILM COATED ORAL 2 TIMES DAILY
Qty: 180 TABLET | Refills: 1 | Status: SHIPPED | OUTPATIENT
Start: 2021-09-20

## 2021-09-20 RX ORDER — HYDROCHLOROTHIAZIDE 25 MG/1
25 TABLET ORAL DAILY
Qty: 90 TABLET | Refills: 1 | Status: SHIPPED | OUTPATIENT
Start: 2021-09-20

## 2021-09-20 NOTE — PROGRESS NOTES
Patient is here for follow-up on HPI:    Patient ID: Lisa Saha is a 61year old male. HPI  Patient is here for follow-up on hypertension. Last seen here in January 18 for final clearance prior to shoulder surgery.   Has underlying hypertension a Smoker      Smokeless tobacco: Never Used    Vaping Use      Vaping Use: Never used    Alcohol use: No      Alcohol/week: 0.0 standard drinks    Drug use: No         Review of Systems          Current Outpatient Medications   Medication Sig Dispense Refill kg)  01/13/21 : 225 lb (102.1 kg)  01/11/21 : 246 lb (111.6 kg)            ASSESSMENT/PLAN:   1. Essential hypertension  Blood pressure is controlled when checked by nurse. Somewhat borderline when checked by physician.   Patient typically has whitecoat hy

## 2021-09-27 ENCOUNTER — HOSPITAL ENCOUNTER (OUTPATIENT)
Dept: MRI IMAGING | Age: 66
Discharge: HOME | End: 2021-09-27
Attending: ORTHOPAEDIC SURGERY
Payer: COMMERCIAL

## 2021-09-27 DIAGNOSIS — M25.562 LEFT KNEE PAIN, UNSPECIFIED CHRONICITY: ICD-10-CM

## 2021-09-27 DIAGNOSIS — M23.92 INTERNAL DERANGEMENT OF LEFT KNEE: ICD-10-CM

## 2021-09-27 PROCEDURE — 73721 MRI JNT OF LWR EXTRE W/O DYE: CPT | Performed by: ORTHOPAEDIC SURGERY

## 2021-10-09 ENCOUNTER — LAB ENCOUNTER (OUTPATIENT)
Dept: LAB | Facility: HOSPITAL | Age: 66
End: 2021-10-09
Attending: INTERNAL MEDICINE
Payer: COMMERCIAL

## 2021-10-09 DIAGNOSIS — I10 ESSENTIAL HYPERTENSION: ICD-10-CM

## 2021-10-09 DIAGNOSIS — Z12.5 SCREENING FOR PROSTATE CANCER: ICD-10-CM

## 2021-10-09 PROCEDURE — 85025 COMPLETE CBC W/AUTO DIFF WBC: CPT

## 2021-10-09 PROCEDURE — 80053 COMPREHEN METABOLIC PANEL: CPT

## 2021-10-09 PROCEDURE — 84443 ASSAY THYROID STIM HORMONE: CPT

## 2021-10-09 PROCEDURE — 80061 LIPID PANEL: CPT

## 2021-10-09 PROCEDURE — 36415 COLL VENOUS BLD VENIPUNCTURE: CPT

## 2021-10-21 ENCOUNTER — OFFICE VISIT (OUTPATIENT)
Dept: OTOLARYNGOLOGY | Facility: CLINIC | Age: 66
End: 2021-10-21
Payer: COMMERCIAL

## 2021-10-21 VITALS — BODY MASS INDEX: 34.36 KG/M2 | WEIGHT: 240 LBS | HEIGHT: 70 IN

## 2021-10-21 DIAGNOSIS — J34.2 NASAL SEPTAL DEVIATION: ICD-10-CM

## 2021-10-21 DIAGNOSIS — J38.3 POLYPOID DEGENERATION OF VOCAL CORDS: ICD-10-CM

## 2021-10-21 DIAGNOSIS — Z85.21 HISTORY OF LARYNGEAL CANCER: Primary | ICD-10-CM

## 2021-10-21 PROCEDURE — 3008F BODY MASS INDEX DOCD: CPT | Performed by: SPECIALIST

## 2021-10-21 PROCEDURE — 92511 NASOPHARYNGOSCOPY: CPT | Performed by: SPECIALIST

## 2021-10-21 PROCEDURE — 99203 OFFICE O/P NEW LOW 30 MIN: CPT | Performed by: SPECIALIST

## 2021-10-21 RX ORDER — AZELASTINE 1 MG/ML
2 SPRAY, METERED NASAL 2 TIMES DAILY
Qty: 30 ML | Refills: 5 | Status: SHIPPED | OUTPATIENT
Start: 2021-10-21

## 2021-10-21 NOTE — PROGRESS NOTES
Shirley Valentine is a 61year old male.  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   Canal - Right: Normal, Left: Normal.    TM - Right: Normal, Left: Normal.   Nasal External nose - Normal.   Nasal septum -left septal deviation  Turbinates -congestion   Oral/Oropharynx Lips - Normal, Tonsils - N

## 2021-10-21 NOTE — PATIENT INSTRUCTIONS
There is no evidence of recurrent disease. Your thyroid function testing is stable. I placed you on a trial of Astelin for nasal congestion. You have an incidental finding of a left septal deviation.     Follow-up in 3 to 4 months time, sooner if problem

## 2021-10-22 PROBLEM — Z85.21 HISTORY OF LARYNGEAL CANCER: Status: ACTIVE | Noted: 2021-10-22

## 2021-11-24 ENCOUNTER — TELEPHONE (OUTPATIENT)
Dept: GASTROENTEROLOGY | Facility: CLINIC | Age: 66
End: 2021-11-24

## 2021-11-24 NOTE — TELEPHONE ENCOUNTER
----- Message from Darwin Davenport sent at 1/27/2017 10:44 AM CST -----  Regarding: Recall colon  Recall colon in 5 years per SDK.  Colon done 1-24-17

## 2021-12-22 ENCOUNTER — OFFICE VISIT (OUTPATIENT)
Dept: INTERNAL MEDICINE CLINIC | Facility: CLINIC | Age: 66
End: 2021-12-22
Payer: COMMERCIAL

## 2021-12-22 ENCOUNTER — LAB ENCOUNTER (OUTPATIENT)
Dept: LAB | Facility: HOSPITAL | Age: 66
End: 2021-12-22
Attending: INTERNAL MEDICINE
Payer: COMMERCIAL

## 2021-12-22 VITALS
RESPIRATION RATE: 20 BRPM | TEMPERATURE: 98 F | DIASTOLIC BLOOD PRESSURE: 74 MMHG | SYSTOLIC BLOOD PRESSURE: 146 MMHG | BODY MASS INDEX: 35.07 KG/M2 | HEART RATE: 68 BPM | WEIGHT: 245 LBS | HEIGHT: 70 IN

## 2021-12-22 DIAGNOSIS — I10 ESSENTIAL HYPERTENSION: ICD-10-CM

## 2021-12-22 DIAGNOSIS — Z01.818 PRE-OP EXAMINATION: Primary | ICD-10-CM

## 2021-12-22 DIAGNOSIS — M25.562 LEFT KNEE PAIN, UNSPECIFIED CHRONICITY: ICD-10-CM

## 2021-12-22 PROCEDURE — 81003 URINALYSIS AUTO W/O SCOPE: CPT | Performed by: INTERNAL MEDICINE

## 2021-12-22 PROCEDURE — 85730 THROMBOPLASTIN TIME PARTIAL: CPT

## 2021-12-22 PROCEDURE — 3078F DIAST BP <80 MM HG: CPT | Performed by: INTERNAL MEDICINE

## 2021-12-22 PROCEDURE — 36415 COLL VENOUS BLD VENIPUNCTURE: CPT

## 2021-12-22 PROCEDURE — 3077F SYST BP >= 140 MM HG: CPT | Performed by: INTERNAL MEDICINE

## 2021-12-22 PROCEDURE — 90471 IMMUNIZATION ADMIN: CPT | Performed by: INTERNAL MEDICINE

## 2021-12-22 PROCEDURE — 93010 ELECTROCARDIOGRAM REPORT: CPT | Performed by: INTERNAL MEDICINE

## 2021-12-22 PROCEDURE — 80053 COMPREHEN METABOLIC PANEL: CPT

## 2021-12-22 PROCEDURE — 85025 COMPLETE CBC W/AUTO DIFF WBC: CPT

## 2021-12-22 PROCEDURE — 93005 ELECTROCARDIOGRAM TRACING: CPT

## 2021-12-22 PROCEDURE — 3008F BODY MASS INDEX DOCD: CPT | Performed by: INTERNAL MEDICINE

## 2021-12-22 PROCEDURE — 99214 OFFICE O/P EST MOD 30 MIN: CPT | Performed by: INTERNAL MEDICINE

## 2021-12-22 PROCEDURE — 90750 HZV VACC RECOMBINANT IM: CPT | Performed by: INTERNAL MEDICINE

## 2021-12-22 PROCEDURE — 85610 PROTHROMBIN TIME: CPT

## 2021-12-22 NOTE — PROGRESS NOTES
HPI:    Patient ID: Jhonathan Grant is a 61year old male. HPI  Patient is here for preoperative evaluation prior to undergoing arthroscopic surgery and repair of his left knee.   MRI recently showed a tear of the meniscus as well as loose bodies in th 01/06/2020    DL and L true vocal cord tumor excision   • UMBILICAL HERNIA REPAIR  10/27/2020    primary closure      No family history on file.    Social History    Tobacco Use      Smoking status: Never Smoker      Smokeless tobacco: Never Used    Vaping Normal pulses. Heart sounds: Normal heart sounds. No murmur heard. Pulmonary:      Effort: Pulmonary effort is normal.      Breath sounds: Normal breath sounds. No wheezing or rales.    Abdominal:      General: Bowel sounds are normal.      Palpat whitecoat hypertension. He is already on 4 different blood pressure medications. We will not change anything right now.          Meds This Visit:  Requested Prescriptions      No prescriptions requested or ordered in this encounter       [facility]

## 2022-01-17 ENCOUNTER — TELEPHONE (OUTPATIENT)
Dept: GASTROENTEROLOGY | Facility: CLINIC | Age: 67
End: 2022-01-17

## 2022-01-17 DIAGNOSIS — Z86.010 PERSONAL HISTORY OF COLONIC POLYPS: Primary | ICD-10-CM

## 2022-01-17 RX ORDER — MULTIVIT-MIN/IRON FUM/FOLIC AC 7.5 MG-4
1 TABLET ORAL DAILY
COMMUNITY

## 2022-01-17 NOTE — TELEPHONE ENCOUNTER
Mallorie:     Patient called to schedule 5 year recall colonoscopy. Please provide order is appropriate.     Thank you    Last Procedure, Date, MD:  Dr. South Gutierrez colonoscopy 1/24/2017  Last Diagnosis: colon polyps x2, normal terminal ileum,  Small external he

## 2022-01-17 NOTE — TELEPHONE ENCOUNTER
COLONOSCOPY REPORT           Mackenzie Santos     [facility]  Age 63year old   PCP Vicky Alexander MD Endoscopist Erin Mi MD      Date of procedure: 2017     Procedure: Colonoscopy with cold snare polypectomy and cold biopsy     Pre-o then with better positioning, cold polypectomy and retrieved. B. 5 mm polyp in the ascending colon; flat morphology; cold snare polypectomy and retrieved.     2. Diverticulosis: none.     3.  Terminal ileum: the visualized mucosa appeared normal.     4 Specimens:   A) - Colon ascending, polyp                                                                          B) - Colon ascending, polyp #2                                                             Final Diagnosis:      A.  Ascending colon polyp; b

## 2022-01-17 NOTE — TELEPHONE ENCOUNTER
Patient received recall letter and calling to schedule colonoscopy. Patient informed of the 5 business day turnaround. Please call at [phone].

## 2022-01-18 RX ORDER — POLYETHYLENE GLYCOL 3350, SODIUM CHLORIDE, SODIUM BICARBONATE, POTASSIUM CHLORIDE 420; 11.2; 5.72; 1.48 G/4L; G/4L; G/4L; G/4L
POWDER, FOR SOLUTION ORAL
Qty: 4000 ML | Refills: 0 | Status: SHIPPED | OUTPATIENT
Start: 2022-01-18

## 2022-01-18 NOTE — TELEPHONE ENCOUNTER
Scheduling:  cln w/ edy w/ Dr. Rafal Weeks  Dx: colon polyps  Split dose trilyte sent e-scribe  Hold losartan day of procedure

## 2022-01-24 NOTE — TELEPHONE ENCOUNTER
Scheduled for:  Colonoscopy [address]  Provider Name:  Dr Lexi Hernandez  Date: 01/31/2022   Location: [facility]     Sedation: MAC   Time:  0930 (pt is aware that [facility] will call with time of arrival   Prep: Trilyte,prep sent via my chart   Meds/Allergies Reconciled?:  Physi

## 2022-01-28 ENCOUNTER — TELEPHONE (OUTPATIENT)
Dept: GASTROENTEROLOGY | Facility: CLINIC | Age: 67
End: 2022-01-28

## 2022-01-28 NOTE — TELEPHONE ENCOUNTER
Pt called to find out if he can take Advil on the day of his colonoscopy scheduled for 1/31/22. Please call.

## 2022-01-31 ENCOUNTER — PATIENT MESSAGE (OUTPATIENT)
Dept: INTERNAL MEDICINE CLINIC | Facility: CLINIC | Age: 67
End: 2022-01-31

## 2022-01-31 ENCOUNTER — SURGERY CENTER DOCUMENTATION (OUTPATIENT)
Dept: SURGERY | Age: 67
End: 2022-01-31

## 2022-01-31 ENCOUNTER — LAB REQUISITION (OUTPATIENT)
Dept: SURGERY | Age: 67
End: 2022-01-31
Payer: COMMERCIAL

## 2022-01-31 DIAGNOSIS — K63.5 POLYP OF ASCENDING COLON, UNSPECIFIED TYPE: ICD-10-CM

## 2022-01-31 PROCEDURE — 88305 TISSUE EXAM BY PATHOLOGIST: CPT | Performed by: INTERNAL MEDICINE

## 2022-01-31 PROCEDURE — 45385 COLONOSCOPY W/LESION REMOVAL: CPT | Performed by: INTERNAL MEDICINE

## 2022-01-31 NOTE — PROCEDURES
COLONOSCOPY REPORT    Drew Lopez     [address] Age 61year old   PCP Stacy Gonzalez MD Endoscopist Forrest Altamirano MD     Date of procedure: 22    Location: [facility])    Procedure: Colonoscopy w/cold retrieved. 2. Diverticulosis: mild in the left colon. 3. Terminal ileum: the visualized mucosa appeared normal.    4. A retroflexed view of the rectum revealed small internal hemorrhoids.     5. The colonic mucosa throughout the colon showed normal va

## 2022-02-02 ENCOUNTER — TELEPHONE (OUTPATIENT)
Dept: OTOLARYNGOLOGY | Facility: CLINIC | Age: 67
End: 2022-02-02

## 2022-02-03 ENCOUNTER — TELEPHONE (OUTPATIENT)
Dept: GASTROENTEROLOGY | Facility: CLINIC | Age: 67
End: 2022-02-03

## 2022-02-04 NOTE — TELEPHONE ENCOUNTER
I mailed out colonoscopy results letter to pt  Updated health maintenance  Entered into 5 yr CLN recall  Recall colon in 5 years per.  Colon done 1/31/2022    Radha La MD  [facility] Clinical Staff  GI staff: please place recall for colonoscopy in 5 years

## 2022-02-08 ENCOUNTER — OFFICE VISIT (OUTPATIENT)
Dept: OTOLARYNGOLOGY | Facility: CLINIC | Age: 67
End: 2022-02-08
Payer: COMMERCIAL

## 2022-02-08 VITALS — BODY MASS INDEX: 35.07 KG/M2 | HEIGHT: 70 IN | WEIGHT: 245 LBS

## 2022-02-08 DIAGNOSIS — Z85.21 HISTORY OF LARYNGEAL CANCER: Primary | ICD-10-CM

## 2022-02-08 PROCEDURE — 99213 OFFICE O/P EST LOW 20 MIN: CPT | Performed by: SPECIALIST

## 2022-02-08 PROCEDURE — 3008F BODY MASS INDEX DOCD: CPT | Performed by: SPECIALIST

## 2022-03-19 RX ORDER — LOSARTAN POTASSIUM 100 MG/1
TABLET ORAL
Qty: 90 TABLET | Refills: 1 | Status: SHIPPED | OUTPATIENT
Start: 2022-03-19

## 2022-03-19 RX ORDER — HYDRALAZINE HYDROCHLORIDE 50 MG/1
TABLET, FILM COATED ORAL
Qty: 180 TABLET | Refills: 1 | Status: SHIPPED | OUTPATIENT
Start: 2022-03-19

## 2022-03-19 NOTE — TELEPHONE ENCOUNTER
Refill passed per [address] protocol.     Requested Prescriptions   Pending Prescriptions Disp Refills    LOSARTAN 100 MG Oral Tab [Pharmacy Med Name: LOSARTAN 100MG TABLETS] 90 tablet 1     Sig: TAKE 1 TABLET(100 MG) BY MOUTH DAILY        Hypertensive Medications Protocol Passed - 3/19/2022 12:02 PM        Passed - CMP or BMP in past 12 months        Passed - Appointment in past 6 or next 3 months        Passed - GFR Non- > 50     Lab Results   Component Value Date    [facility] 92 12/22/2021                    HYDRALAZINE 50 MG Oral Tab [Pharmacy Med Name: HYDRALAZINE  50MG TABLETS(ORANGE)] 180 tablet 1     Sig: TAKE 1 TABLET(50 MG) BY MOUTH TWICE DAILY        Hypertensive Medications Protocol Passed - 3/19/2022 12:02 PM        Passed - CMP or BMP in past 12 months        Passed - Appointment in past 6 or next 3 months        Passed - GFR Non- > 50     Lab Results   Component Value Date    [facility] 92 12/22/2021                     Recent Outpatient Visits              2 weeks ago Internal derangement of left knee    [facility] - [address], [place], [place]    Office Visit    1 month ago History of laryngeal cancer    Jaime Morgan MD    Office Visit    2 months ago Pre-op examination    [address], [address] [facility] [address], [place], Claudean Man, MD    Office Visit    4 months ago History of laryngeal cancer    Jaime Morgan MD    Office Visit    6 months ago Essential hypertension    [address], [address], Prateek Smiley MD    Office Visit          Future Appointments         Provider Department Appt Notes    In 1 month Katelin Patton MD [address], [address], [place] 3 month f/u vocal chords

## 2022-03-20 RX ORDER — HYDROCHLOROTHIAZIDE 25 MG/1
25 TABLET ORAL DAILY
Qty: 90 TABLET | Refills: 1 | Status: SHIPPED | OUTPATIENT
Start: 2022-03-20 | End: 2022-10-01

## 2022-03-20 NOTE — TELEPHONE ENCOUNTER
Refill passed per [facility] ([facility] protocol.     Requested Prescriptions   Pending Prescriptions Disp Refills    HYDROCHLOROTHIAZIDE 25 MG Oral Tab [Pharmacy Med Name: HYDROCHLOROTHIAZIDE 25MG TABLETS] 90 tablet 1     Sig: TAKE 1 TABLET(25 MG) BY MOUTH DAILY        Hypertensive Medications Protocol Passed - 3/19/2022 12:19 PM        Passed - CMP or BMP in past 12 months        Passed - Appointment in past 6 or next 3 months        Passed - GFR Non- > 50     Lab Results   Component Value Date    [facility] 92 12/22/2021                     Recent Outpatient Visits              2 weeks ago Internal derangement of left knee    Or[facility] - [address], [place], [place]    Office Visit    1 month ago History of laryngeal cancer    [address] MD    Office Visit    2 months ago Pre-op examination    [facility], [address], Derrell Valdes MD    Office Visit    5 months ago History of laryngeal cancer    [address] MD    Office Visit    6 months ago Essential hypertension    [facility], [address], Marcellus Alexander MD    Office Visit          Future Appointments         Provider Department Appt Notes    In 1 month Yari Castellanos MD [employer] ([facility]), [address], [place] 3 month f/u vocal chords

## 2022-03-23 RX ORDER — AMLODIPINE BESYLATE 10 MG/1
10 TABLET ORAL DAILY
Qty: 90 TABLET | Refills: 1 | Status: SHIPPED | OUTPATIENT
Start: 2022-03-23

## 2022-03-23 NOTE — TELEPHONE ENCOUNTER
Refill passed per [facility] protocol.   Requested Prescriptions   Pending Prescriptions Disp Refills    AMLODIPINE 10 MG Oral Tab [Pharmacy Med Name: AMLODIPINE BESYLATE 10MG TABLETS] 90 tablet 1     Sig: TAKE 1 TABLET(10 MG) BY MOUTH DAILY        Hypertensive Medications Protocol Passed - 3/23/2022  3:28 PM        Passed - CMP or BMP in past 12 months        Passed - Appointment in past 6 or next 3 months        Passed - GFR Non- > 50     Lab Results   Component Value Date    [facility] 92 12/22/2021                     Recent Outpatient Visits              2 weeks ago Internal derangement of left knee    [facility] - [address], [place], [facility]    Office Visit    1 month ago History of laryngeal cancer    [address] MD    Office Visit    3 months ago Pre-op examination    [facility], [address], Rosalia Valdes MD    Office Visit    5 months ago History of laryngeal cancer    [address] MD    Office Visit    6 months ago Essential hypertension    [facility], [address], Dre Martinez MD    Office Visit          Future Appointments         Provider Department Appt Notes    In 1 month Juan Manuel Cr MD [facility], [address], [place] 3 month f/u vocal chords

## 2022-05-10 ENCOUNTER — OFFICE VISIT (OUTPATIENT)
Dept: OTOLARYNGOLOGY | Facility: CLINIC | Age: 67
End: 2022-05-10
Payer: COMMERCIAL

## 2022-05-10 VITALS — HEIGHT: 70 IN | BODY MASS INDEX: 32.21 KG/M2 | WEIGHT: 225 LBS

## 2022-05-10 DIAGNOSIS — J38.3 VOCAL CORD CYST: ICD-10-CM

## 2022-05-10 DIAGNOSIS — Z85.21 HISTORY OF LARYNGEAL CANCER: Primary | ICD-10-CM

## 2022-05-10 PROCEDURE — 99213 OFFICE O/P EST LOW 20 MIN: CPT | Performed by: SPECIALIST

## 2022-05-10 PROCEDURE — 31575 DIAGNOSTIC LARYNGOSCOPY: CPT | Performed by: SPECIALIST

## 2022-05-10 PROCEDURE — 3008F BODY MASS INDEX DOCD: CPT | Performed by: SPECIALIST

## 2022-05-10 NOTE — PATIENT INSTRUCTIONS
There is no evidence of recurrent cancer. There is a small left posterior very benign looking vocal cord cyst.  Follow-up in 3 months time, sooner if problems.

## 2022-08-23 ENCOUNTER — OFFICE VISIT (OUTPATIENT)
Dept: OTOLARYNGOLOGY | Facility: CLINIC | Age: 67
End: 2022-08-23
Payer: COMMERCIAL

## 2022-08-23 VITALS — HEIGHT: 70 IN | BODY MASS INDEX: 32.21 KG/M2 | TEMPERATURE: 98 F | WEIGHT: 225 LBS

## 2022-08-23 DIAGNOSIS — Z85.21 HISTORY OF LARYNGEAL CANCER: Primary | ICD-10-CM

## 2022-08-23 PROCEDURE — 3008F BODY MASS INDEX DOCD: CPT | Performed by: SPECIALIST

## 2022-08-23 PROCEDURE — 99213 OFFICE O/P EST LOW 20 MIN: CPT | Performed by: SPECIALIST

## 2022-10-01 RX ORDER — LOSARTAN POTASSIUM 100 MG/1
TABLET ORAL
Qty: 90 TABLET | Refills: 1 | Status: SHIPPED | OUTPATIENT
Start: 2022-10-01

## 2022-10-01 RX ORDER — AMLODIPINE BESYLATE 10 MG/1
TABLET ORAL
Qty: 90 TABLET | Refills: 1 | Status: SHIPPED | OUTPATIENT
Start: 2022-10-01

## 2022-10-01 RX ORDER — HYDROCHLOROTHIAZIDE 25 MG/1
TABLET ORAL
Qty: 90 TABLET | Refills: 1 | Status: SHIPPED | OUTPATIENT
Start: 2022-10-01

## 2022-10-01 NOTE — TELEPHONE ENCOUNTER
Please review; Protocol Failed / No protocol. No recent labs found in Media or Care Everywhere    Requested Prescriptions   Pending Prescriptions Disp Refills    LOSARTAN 100 MG Oral Tab [Pharmacy Med Name: LOSARTAN 100MG TABLETS] 90 tablet 1     Sig: TAKE 1 TABLET(100 MG) BY MOUTH DAILY        Hypertensive Medications Protocol Failed - 9/30/2022  6:11 AM        Failed - Last BP reading less than 140/90     BP Readings from Last 1 Encounters:  02/17/22 : 144/70                Failed - CMP or BMP in past 6 months     No results found for this or any previous visit (from the past 4392 hour(s)).               Failed - In person appointment or virtual visit in the past 6 months       Recent Outpatient Visits              1 month ago History of laryngeal cancer    [address] Elane Alcon, MD    Office Visit    4 months ago History of laryngeal cancer    [address] Elane Alcon, MD    Office Visit    7 months ago Internal derangement of left knee    [facility] [address] [facility]    Office Visit    7 months ago History of laryngeal cancer    Maria Del Rosario Brunson MD    Office Visit    9 months ago Pre-op examination    Briana Gentile, Glenda Chaudhry MD    Office Visit     Future Appointments         Provider Department Appt Notes    In 1 month Candance Berber, MD [address] [place] Follow up on vocal cords               Passed - In person appointment in the past 12 or next 3 months       Recent Outpatient Visits              1 month ago History of laryngeal cancer    Maria Del Rosario Brunson MD    Office Visit    4 months ago History of laryngeal cancer    Maria Del Rosario Brunson MD    Office Visit    7 months ago Internal derangement of left knee    Orthopaedics - [address], [employer], Donald Palencia    Office Visit    7 months ago History of laryngeal cancer    [address], [address], Barbra Diallo MD    Office Visit    9 months ago Pre-op examination    [address] [facility], Marcellus Alexander MD    Office Visit     Future Appointments         Provider Department Appt Notes    In 1 month Yari Castellanos MD [address], [address], [place] Follow up on vocal cords               Passed - EGFRCR or GFRNAA > 50     GFR Evaluation  GFRNAA: 92 , resulted on 12/22/2021               HYDROCHLOROTHIAZIDE 25 MG Oral Tab [Pharmacy Med Name: HYDROCHLOROTHIAZIDE 25MG TABLETS] 90 tablet 1     Sig: TAKE 1 TABLET(25 MG) BY MOUTH DAILY        Hypertensive Medications Protocol Failed - 9/30/2022  6:11 AM        Failed - Last BP reading less than 140/90     BP Readings from Last 1 Encounters:  02/17/22 : 144/70                Failed - CMP or BMP in past 6 months     No results found for this or any previous visit (from the past 4392 hour(s)).               Failed - In person appointment or virtual visit in the past 6 months       Recent Outpatient Visits              1 month ago History of laryngeal cancer    [address], [place]ita Zarate, Barbra Diallo MD    Office Visit    4 months ago History of laryngeal cancer    [address], Andrzej Zarate, Barbra Diallo MD    Office Visit    7 months ago Internal derangement of left knee    Orthopaedics - [address] [place], [employer]    Office Visit    7 months ago History of laryngeal cancer    [address], Barbra Diallo MD    Office Visit    9 months ago Pre-op examination    [address] [facility], Marcellus Alexander MD    Office Visit     Future Appointments         Provider Department Appt Notes    In 1 month Yari Castellanos MD [address] [place], [address], [place] Follow up on vocal cords               Passed - In person appointment in the past 12 or next 3 months       Recent Outpatient Visits              1 month ago History of laryngeal cancer    [facility], Andrzej Zarate, Omkar Deutsch MD    Office Visit    4 months ago History of laryngeal cancer    [facility], [facility], Omkar Jo MD    Office Visit    7 months ago Internal derangement of left knee    Orthopaedics - [address] [place] [address]    Office Visit    7 months ago History of laryngeal cancer    [address] Omkar Delgado MD    Office Visit    9 months ago Pre-op examination    [address] [facility], Ozzy Ramos MD    Office Visit     Future Appointments         Provider Department Appt Notes    In 1 month [facility], Andrzej Zarate, [place] Follow up on vocal cords               Passed - EGFRCR or GFRNAA > 50     GFR Evaluation  GFRNAA: 92 , resulted on 12/22/2021               AMLODIPINE 10 MG Oral Tab [Pharmacy Med Name: AMLODIPINE BESYLATE 10MG TABLETS] 90 tablet 1     Sig: TAKE 1 TABLET(10 MG) BY MOUTH DAILY        Hypertensive Medications Protocol Failed - 9/30/2022  6:11 AM        Failed - Last BP reading less than 140/90     BP Readings from Last 1 Encounters:  02/17/22 : 144/70                Failed - CMP or BMP in past 6 months     No results found for this or any previous visit (from the past 4392 hour(s)).               Failed - In person appointment or virtual visit in the past 6 months       Recent Outpatient Visits              1 month ago History of laryngeal cancer    [address] Omkar Delgado MD    Office Visit    4 months ago History of laryngeal cancer    [facility], [facility], Omkar Jo MD    Office Visit    7 months ago Internal derangement of left knee    Orthopaedics - [address] [place] [address]    Office Visit    7 months ago History of laryngeal cancer    [address] Dimitri Lopez MD    Office Visit    9 months ago Pre-op examination    [address] [facility] Carol Rutledge MD    Office Visit     Future Appointments         Provider Department Appt Notes    In 1 month Natalya Spain MD [facility] [employer] [facility], [address], [place] Follow up on vocal cords               Passed - In person appointment in the past 12 or next 3 months       Recent Outpatient Visits              1 month ago History of laryngeal cancer    [facility] [employer], [facility], [address], Herman Dutta MD    Office Visit    4 months ago History of laryngeal cancer    [facility] [employer], [facility], [address], Herman Dutta MD    Office Visit    7 months ago Internal derangement of left knee    Orthopaedics - [address] [place], [place], [employer]    Office Visit    7 months ago History of laryngeal cancer    [facility] [employer], [facility], [address], Herman Dutta MD    Office Visit    9 months ago Pre-op examination    [address] Carol Rainey MD    Office Visit     Future Appointments         Provider Department Appt Notes    In 1 month Natalya Spain MD [facility] [employer] [facility], [address], [place] Follow up on vocal cords               Passed - [facility] or GFRNAA > 50     GFR Evaluation  GFRNAA: 92 , resulted on 12/22/2021                   Recent Outpatient Visits              1 month ago History of laryngeal cancer    [address] Herman Delgado MD    Office Visit    4 months ago History of laryngeal cancer    [facility] [employer], [facility], [address], Herman Dutta MD    Office Visit    7 months ago Internal derangement of left knee    Orthopaedics - [address] [place], [place], [employer]    Office Visit    7 months ago History of laryngeal cancer    [address] Herman Delgado MD    Office Visit    9 months ago Pre-op examination    [address] Carol Rainey MD Office Visit             Future Appointments         Provider Department Appt Notes    In 1 month Norma Nicole MD [address], [facility], [place] Follow up on vocal cords

## 2022-11-29 ENCOUNTER — OFFICE VISIT (OUTPATIENT)
Dept: OTOLARYNGOLOGY | Facility: CLINIC | Age: 67
End: 2022-11-29
Payer: MEDICARE

## 2022-11-29 VITALS — WEIGHT: 225 LBS | TEMPERATURE: 98 F | BODY MASS INDEX: 32.21 KG/M2 | HEIGHT: 70 IN

## 2022-11-29 DIAGNOSIS — Z85.21 HISTORY OF LARYNGEAL CANCER: Primary | ICD-10-CM

## 2022-11-29 PROCEDURE — 99213 OFFICE O/P EST LOW 20 MIN: CPT | Performed by: SPECIALIST

## 2023-03-01 ENCOUNTER — OFFICE VISIT (OUTPATIENT)
Dept: INTERNAL MEDICINE CLINIC | Facility: CLINIC | Age: 68
End: 2023-03-01

## 2023-03-01 VITALS
HEIGHT: 70 IN | DIASTOLIC BLOOD PRESSURE: 80 MMHG | TEMPERATURE: 98 F | HEART RATE: 82 BPM | BODY MASS INDEX: 34.79 KG/M2 | SYSTOLIC BLOOD PRESSURE: 142 MMHG | RESPIRATION RATE: 20 BRPM | WEIGHT: 243 LBS

## 2023-03-01 DIAGNOSIS — M19.90 OSTEOARTHRITIS, UNSPECIFIED OSTEOARTHRITIS TYPE, UNSPECIFIED SITE: ICD-10-CM

## 2023-03-01 DIAGNOSIS — Z12.5 SCREENING FOR PROSTATE CANCER: ICD-10-CM

## 2023-03-01 DIAGNOSIS — Z85.21 HISTORY OF LARYNGEAL CANCER: ICD-10-CM

## 2023-03-01 DIAGNOSIS — I10 ESSENTIAL HYPERTENSION: Primary | ICD-10-CM

## 2023-03-01 PROCEDURE — 3079F DIAST BP 80-89 MM HG: CPT | Performed by: INTERNAL MEDICINE

## 2023-03-01 PROCEDURE — 3077F SYST BP >= 140 MM HG: CPT | Performed by: INTERNAL MEDICINE

## 2023-03-01 PROCEDURE — 99214 OFFICE O/P EST MOD 30 MIN: CPT | Performed by: INTERNAL MEDICINE

## 2023-03-01 PROCEDURE — 3008F BODY MASS INDEX DOCD: CPT | Performed by: INTERNAL MEDICINE

## 2023-03-01 RX ORDER — HYDROCHLOROTHIAZIDE 25 MG/1
25 TABLET ORAL DAILY
Qty: 90 TABLET | Refills: 1 | Status: SHIPPED | OUTPATIENT
Start: 2023-03-01

## 2023-03-01 RX ORDER — TRAMADOL HYDROCHLORIDE 50 MG/1
50 TABLET ORAL EVERY EVENING
COMMUNITY
Start: 2023-02-18

## 2023-03-01 RX ORDER — AMLODIPINE BESYLATE 10 MG/1
10 TABLET ORAL DAILY
Qty: 90 TABLET | Refills: 1 | Status: SHIPPED | OUTPATIENT
Start: 2023-03-01

## 2023-03-01 RX ORDER — LOSARTAN POTASSIUM 100 MG/1
100 TABLET ORAL DAILY
Qty: 90 TABLET | Refills: 1 | Status: SHIPPED | OUTPATIENT
Start: 2023-03-01

## 2023-03-01 RX ORDER — AZELASTINE 1 MG/ML
2 SPRAY, METERED NASAL 2 TIMES DAILY
Qty: 30 ML | Refills: 5 | Status: SHIPPED | OUTPATIENT
Start: 2023-03-01

## 2023-03-01 RX ORDER — PREDNISONE 20 MG/1
TABLET ORAL
COMMUNITY
Start: 2022-12-21 | End: 2023-03-01 | Stop reason: ALTCHOICE

## 2023-03-07 ENCOUNTER — OFFICE VISIT (OUTPATIENT)
Dept: OTOLARYNGOLOGY | Facility: CLINIC | Age: 68
End: 2023-03-07

## 2023-03-07 VITALS — TEMPERATURE: 97 F | BODY MASS INDEX: 34.79 KG/M2 | HEIGHT: 70 IN | WEIGHT: 243 LBS

## 2023-03-07 DIAGNOSIS — J38.3 POLYPOID DEGENERATION OF VOCAL CORDS: ICD-10-CM

## 2023-03-07 DIAGNOSIS — Z85.21 HISTORY OF LARYNGEAL CANCER: Primary | ICD-10-CM

## 2023-03-07 PROCEDURE — 99213 OFFICE O/P EST LOW 20 MIN: CPT | Performed by: SPECIALIST

## 2023-03-07 PROCEDURE — 3008F BODY MASS INDEX DOCD: CPT | Performed by: SPECIALIST

## 2023-03-07 NOTE — PATIENT INSTRUCTIONS
No evidence of recurrent or metastatic laryngeal cancer. Follow-up in 4 months time, sooner if problems.

## 2023-03-29 NOTE — TELEPHONE ENCOUNTER
Please review. Protocol Failed or has No Protocol. Requested Prescriptions   Pending Prescriptions Disp Refills    HYDRALAZINE 50 MG Oral Tab [Pharmacy Med Name: HYDRALAZINE  50MG TABLETS(ORANGE)] 180 tablet 0     Sig: TAKE 1 TABLET(50 MG) BY MOUTH TWICE DAILY       Hypertensive Medications Protocol Failed - 3/29/2023  5:50 AM        Failed - Last BP reading less than 140/90     BP Readings from Last 1 Encounters:  03/01/23 : 142/80              Failed - CMP or BMP in past 6 months     No results found for this or any previous visit (from the past 4392 hour(s)).             Failed - EGFRCR or GFRNAA > 50     GFR Evaluation            Passed - In person appointment in the past 12 or next 3 months     Recent Outpatient Visits              3 weeks ago History of laryngeal cancer    [address], Barbra Diallo MD    Office Visit    4 weeks ago Essential hypertension    Marcellus Garnica MD    Office Visit    4 months ago History of laryngeal cancer    [address], Barbra Diallo MD    Office Visit    7 months ago History of laryngeal cancer    [address], Barbra Diallo MD    Office Visit    10 months ago History of laryngeal cancer    [address], Barbra Diallo MD    Office Visit          Future Appointments         Provider Department Appt Notes    In 1 month Piter Coyne MD [address], [address] [facility] last px 9/21/20               Passed - In person appointment or virtual visit in the past 6 months     Recent Outpatient Visits              3 weeks ago History of laryngeal cancer    [address], Barbra Diallo MD    Office Visit    4 weeks ago Essential hypertension    [address], [address] [facility]    Office Visit    4 months ago History of laryngeal cancer    [address] [facility], Whit Land MD    Office Visit    7 months ago History of laryngeal cancer    [address] [facility], Whit Land MD    Office Visit    10 months ago History of laryngeal cancer    [address] [facility], Whit Land MD    Office Visit          Future Appointments         Provider Department Appt Notes    In 1 month Mirella Chang MD [address], [address] [facility] px 9/21/20                    Recent Outpatient Visits              3 weeks ago History of laryngeal cancer    [address] [facility], Whit Land MD    Office Visit    4 weeks ago Essential hypertension    [address] [place] [address], Cha Galindo MD    Office Visit    4 months ago History of laryngeal cancer    [address] [facility], Whit Land MD    Office Visit    7 months ago History of laryngeal cancer    [address] [facility], Whit Land MD    Office Visit    10 months ago History of laryngeal cancer    [address] [facility], Ajay Araujo, Diane Johnson MD    Office Visit            Future Appointments         Provider Department Appt Notes    In 1 month Mirella Chang MD [address], [address] [facility] px 9/21/20

## 2023-03-30 RX ORDER — HYDRALAZINE HYDROCHLORIDE 50 MG/1
50 TABLET, FILM COATED ORAL 2 TIMES DAILY
Qty: 180 TABLET | Refills: 3 | Status: SHIPPED | OUTPATIENT
Start: 2023-03-30

## 2023-04-15 ENCOUNTER — LAB ENCOUNTER (OUTPATIENT)
Dept: LAB | Facility: HOSPITAL | Age: 68
End: 2023-04-15
Attending: INTERNAL MEDICINE
Payer: COMMERCIAL

## 2023-04-15 DIAGNOSIS — Z12.5 SCREENING FOR PROSTATE CANCER: ICD-10-CM

## 2023-04-15 DIAGNOSIS — I10 ESSENTIAL HYPERTENSION: ICD-10-CM

## 2023-04-15 LAB
ALBUMIN SERPL-MCNC: 3.9 G/DL (ref 3.4–5)
ALBUMIN/GLOB SERPL: 1.2 {RATIO} (ref 1–2)
ALP LIVER SERPL-CCNC: 94 U/L
ALT SERPL-CCNC: 48 U/L
ANION GAP SERPL CALC-SCNC: 6 MMOL/L (ref 0–18)
AST SERPL-CCNC: 30 U/L (ref 15–37)
BASOPHILS # BLD AUTO: 0.06 X10(3) UL (ref 0–0.2)
BASOPHILS NFR BLD AUTO: 0.8 %
BILIRUB SERPL-MCNC: 0.9 MG/DL (ref 0.1–2)
BUN BLD-MCNC: 17 MG/DL (ref 7–18)
BUN/CREAT SERPL: 17.7 (ref 10–20)
CALCIUM BLD-MCNC: 9.4 MG/DL (ref 8.5–10.1)
CHLORIDE SERPL-SCNC: 109 MMOL/L (ref 98–112)
CHOLEST SERPL-MCNC: 151 MG/DL (ref ?–200)
CO2 SERPL-SCNC: 27 MMOL/L (ref 21–32)
COMPLEXED PSA SERPL-MCNC: 2.68 NG/ML (ref ?–4)
CREAT BLD-MCNC: 0.96 MG/DL
DEPRECATED RDW RBC AUTO: 41.6 FL (ref 35.1–46.3)
EOSINOPHIL # BLD AUTO: 0.33 X10(3) UL (ref 0–0.7)
EOSINOPHIL NFR BLD AUTO: 4.2 %
ERYTHROCYTE [DISTWIDTH] IN BLOOD BY AUTOMATED COUNT: 12.7 % (ref 11–15)
FASTING PATIENT LIPID ANSWER: NO
FASTING STATUS PATIENT QL REPORTED: NO
GFR SERPLBLD BASED ON 1.73 SQ M-ARVRAT: 88 ML/MIN/1.73M2 (ref 60–?)
GLOBULIN PLAS-MCNC: 3.3 G/DL (ref 2.8–4.4)
GLUCOSE BLD-MCNC: 90 MG/DL (ref 70–99)
HCT VFR BLD AUTO: 46.9 %
HDLC SERPL-MCNC: 33 MG/DL (ref 40–59)
HGB BLD-MCNC: 16.3 G/DL
IMM GRANULOCYTES # BLD AUTO: 0.03 X10(3) UL (ref 0–1)
IMM GRANULOCYTES NFR BLD: 0.4 %
LDLC SERPL CALC-MCNC: 80 MG/DL (ref ?–100)
LYMPHOCYTES # BLD AUTO: 1.66 X10(3) UL (ref 1–4)
LYMPHOCYTES NFR BLD AUTO: 21.3 %
MCH RBC QN AUTO: 30.8 PG (ref 26–34)
MCHC RBC AUTO-ENTMCNC: 34.8 G/DL (ref 31–37)
MCV RBC AUTO: 88.7 FL
MONOCYTES # BLD AUTO: 0.72 X10(3) UL (ref 0.1–1)
MONOCYTES NFR BLD AUTO: 9.2 %
NEUTROPHILS # BLD AUTO: 5 X10 (3) UL (ref 1.5–7.7)
NEUTROPHILS # BLD AUTO: 5 X10(3) UL (ref 1.5–7.7)
NEUTROPHILS NFR BLD AUTO: 64.1 %
NONHDLC SERPL-MCNC: 118 MG/DL (ref ?–130)
OSMOLALITY SERPL CALC.SUM OF ELEC: 295 MOSM/KG (ref 275–295)
PLATELET # BLD AUTO: 208 10(3)UL (ref 150–450)
POTASSIUM SERPL-SCNC: 3.5 MMOL/L (ref 3.5–5.1)
PROT SERPL-MCNC: 7.2 G/DL (ref 6.4–8.2)
RBC # BLD AUTO: 5.29 X10(6)UL
SODIUM SERPL-SCNC: 142 MMOL/L (ref 136–145)
TRIGL SERPL-MCNC: 230 MG/DL (ref 30–149)
TSI SER-ACNC: 3.13 MIU/ML (ref 0.36–3.74)
VLDLC SERPL CALC-MCNC: 36 MG/DL (ref 0–30)
WBC # BLD AUTO: 7.8 X10(3) UL (ref 4–11)

## 2023-04-15 PROCEDURE — 85025 COMPLETE CBC W/AUTO DIFF WBC: CPT

## 2023-04-15 PROCEDURE — 80053 COMPREHEN METABOLIC PANEL: CPT

## 2023-04-15 PROCEDURE — 80061 LIPID PANEL: CPT

## 2023-04-15 PROCEDURE — 84443 ASSAY THYROID STIM HORMONE: CPT

## 2023-04-15 PROCEDURE — 36415 COLL VENOUS BLD VENIPUNCTURE: CPT

## 2023-05-10 ENCOUNTER — OFFICE VISIT (OUTPATIENT)
Dept: INTERNAL MEDICINE CLINIC | Facility: CLINIC | Age: 68
End: 2023-05-10

## 2023-05-10 VITALS
HEIGHT: 70 IN | WEIGHT: 243.81 LBS | BODY MASS INDEX: 34.9 KG/M2 | TEMPERATURE: 97 F | SYSTOLIC BLOOD PRESSURE: 132 MMHG | RESPIRATION RATE: 18 BRPM | HEART RATE: 76 BPM | OXYGEN SATURATION: 96 % | DIASTOLIC BLOOD PRESSURE: 72 MMHG

## 2023-05-10 DIAGNOSIS — Z23 NEED FOR VACCINATION: ICD-10-CM

## 2023-05-10 DIAGNOSIS — Z00.00 ENCOUNTER FOR ANNUAL HEALTH EXAMINATION: Primary | ICD-10-CM

## 2023-05-10 DIAGNOSIS — I10 ESSENTIAL HYPERTENSION: ICD-10-CM

## 2023-05-10 DIAGNOSIS — Z85.21 HISTORY OF LARYNGEAL CANCER: ICD-10-CM

## 2023-05-10 DIAGNOSIS — I83.90 VARICOSE VEINS: ICD-10-CM

## 2023-05-10 DIAGNOSIS — M19.90 OSTEOARTHRITIS, UNSPECIFIED OSTEOARTHRITIS TYPE, UNSPECIFIED SITE: ICD-10-CM

## 2023-05-10 PROBLEM — C32.0 VOCAL CORD CANCER (HCC): Status: RESOLVED | Noted: 2020-01-10 | Resolved: 2023-05-10

## 2023-05-10 RX ORDER — AMLODIPINE BESYLATE 10 MG/1
10 TABLET ORAL DAILY
Qty: 90 TABLET | Refills: 1 | Status: SHIPPED | OUTPATIENT
Start: 2023-05-10

## 2023-05-10 RX ORDER — MELOXICAM 15 MG/1
TABLET ORAL
COMMUNITY
Start: 2023-05-09

## 2023-07-11 ENCOUNTER — OFFICE VISIT (OUTPATIENT)
Dept: OTOLARYNGOLOGY | Facility: CLINIC | Age: 68
End: 2023-07-11

## 2023-07-11 VITALS — HEIGHT: 70 IN | WEIGHT: 243 LBS | BODY MASS INDEX: 34.79 KG/M2

## 2023-07-11 DIAGNOSIS — C32.0 VOCAL CORD CANCER (HCC): Primary | ICD-10-CM

## 2023-07-11 PROCEDURE — 99213 OFFICE O/P EST LOW 20 MIN: CPT | Performed by: SPECIALIST

## 2023-07-11 PROCEDURE — 3008F BODY MASS INDEX DOCD: CPT | Performed by: SPECIALIST

## 2023-07-12 NOTE — PATIENT INSTRUCTIONS
Follow-up in 3 months time, sooner if problems. No evidence of cancer on the left anterior true vocal cord.

## 2023-09-22 RX ORDER — HYDROCHLOROTHIAZIDE 25 MG/1
25 TABLET ORAL DAILY
Qty: 90 TABLET | Refills: 1 | Status: SHIPPED | OUTPATIENT
Start: 2023-09-22

## 2023-09-22 RX ORDER — LOSARTAN POTASSIUM 100 MG/1
100 TABLET ORAL DAILY
Qty: 90 TABLET | Refills: 1 | Status: SHIPPED | OUTPATIENT
Start: 2023-09-22

## 2023-09-22 NOTE — TELEPHONE ENCOUNTER
Current Outpatient Medications:       losartan 100 MG Oral Tab, Take 1 tablet (100 mg total) by mouth daily. , Disp: 90 tablet, Rfl: 1  :

## 2023-09-22 NOTE — TELEPHONE ENCOUNTER
Current Outpatient Medications:       hydroCHLOROthiazide 25 MG Oral Tab, Take 1 tablet (25 mg total) by mouth daily. , Disp: 90 tablet, Rfl: 1

## 2023-09-23 NOTE — TELEPHONE ENCOUNTER
Refill passed per [employer], [employer] protocol. Requested Prescriptions   Pending Prescriptions Disp Refills    hydroCHLOROthiazide 25 MG Oral Tab 90 tablet 1     Sig: Take 1 tablet (25 mg total) by mouth daily.        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[facility])    [address], [address], Satya Diane MD    Office Visit    4 months ago Encounter for annual health examination    Lucio Aguilar Deeann [place], MD    Office Visit    6 months ago History of laryngeal cancer    Teresa Maldonado, Satya Diane MD    Office Visit    6 months ago Essential hypertension    Lucio Aguilar Deeann [place], MD    Office Visit    9 months ago History of laryngeal cancer    Satya Chung MD    Office Visit          Future Appointments         Provider Department Appt Notes    In 2 weeks MD Teresa Mckeon, [place] Follow up                    Passed - Last BP reading less than 140/90     BP Readings from Last 1 Encounters:  05/10/23 : 132/72              Passed - CMP or BMP in past 6 months     Recent Results (from the past 4392 hour(s))   Comp Metabolic Panel (14)    Collection Time: 04/15/23  6:47 AM   Result Value Ref Range    Glucose 90 70 - 99 mg/dL    Sodium 142 136 - 145 mmol/L    Potassium 3.5 3.5 - 5.1 mmol/L    Chloride 109 98 - 112 mmol/L    CO2 27.0 21.0 - 32.0 mmol/L    Anion Gap 6 0 - 18 mmol/L    BUN 17 7 - 18 mg/dL    Creatinine 0.96 0.70 - 1.30 mg/dL    BUN/CREA Ratio 17.7 10.0 - 20.0    Calcium, Total 9.4 8.5 - 10.1 mg/dL    Calculated Osmolality 295 275 - 295 mOsm/kg    eGFR-Cr 88 >=60 mL/min/1.73m2 ALT 48 16 - 61 U/L    AST 30 15 - 37 U/L    Alkaline Phosphatase 94 45 - 117 U/L    Bilirubin, Total 0.9 0.1 - 2.0 mg/dL    Total Protein 7.2 6.4 - 8.2 g/dL    Albumin 3.9 3.4 - 5.0 g/dL    Globulin  3.3 2.8 - 4.4 g/dL    A/G Ratio 1.2 1.0 - 2.0    Patient Fasting for CMP? No      *Note: Due to a large number of results and/or encounters for the requested time period, some results have not been displayed. A complete set of results can be found in Results Review.                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[facility])    [address], [address], Aileen Irving MD    Office Visit    4 months ago Encounter for annual health examination    Lluvia Valencia MD    Office Visit    6 months ago History of laryngeal cancer    Aileen Cheatham MD    Office Visit    6 months ago Essential hypertension    Lluvia Valencia MD    Office Visit    9 months ago History of laryngeal cancer    Aileen Cheatham MD    Office Visit          Future Appointments         Provider Department Appt Notes    In 2 weeks Laurence Essex, MD [employer], [place] Follow up                    Passed - [facility] or GFRNAA > 50     GFR Evaluation  EGFRCR: 88 , resulted on 4/15/2023                Future Appointments         Provider Department Appt Notes    In 2 weeks Laurence Essex, MD [employer], [place] Follow up             Recent Outpatient Visits              2 months ago 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

## 2023-09-23 NOTE — TELEPHONE ENCOUNTER
Refill passed per [employer], [employer] protocol. Requested Prescriptions   Pending Prescriptions Disp Refills    losartan 100 MG Oral Tab 90 tablet 1     Sig: Take 1 tablet (100 mg total) by mouth daily.        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[facility])    [address], [address], Aye Cody MD    Office Visit    4 months ago Encounter for annual health examination    Lucio Schmidt Lan Brownie, MD    Office Visit    6 months ago History of laryngeal cancer    Aye Ruiz MD    Office Visit    6 months ago Essential hypertension    Lucio Schmidt Lan Brownie, MD    Office Visit    9 months ago History of laryngeal cancer    Aye Ruiz MD    Office Visit          Future Appointments         Provider Department Appt Notes    In 2 weeks MD Griselda Ventura [place] Follow up                    Passed - Last BP reading less than 140/90     BP Readings from Last 1 Encounters:  05/10/23 : 132/72              Passed - CMP or BMP in past 6 months     Recent Results (from the past 4392 hour(s))   Comp Metabolic Panel (14)    Collection Time: 04/15/23  6:47 AM   Result Value Ref Range    Glucose 90 70 - 99 mg/dL    Sodium 142 136 - 145 mmol/L    Potassium 3.5 3.5 - 5.1 mmol/L    Chloride 109 98 - 112 mmol/L    CO2 27.0 21.0 - 32.0 mmol/L    Anion Gap 6 0 - 18 mmol/L    BUN 17 7 - 18 mg/dL    Creatinine 0.96 0.70 - 1.30 mg/dL    BUN/CREA Ratio 17.7 10.0 - 20.0    Calcium, Total 9.4 8.5 - 10.1 mg/dL    Calculated Osmolality 295 275 - 295 mOsm/kg    eGFR-Cr 88 >=60 mL/min/1.73m2    ALT 48 16 - 61 U/L    AST 30 15 - 37 U/L    Alkaline Phosphatase 94 45 - 117 U/L    Bilirubin, Total 0.9 0.1 - 2.0 mg/dL    Total Protein 7.2 6.4 - 8.2 g/dL    Albumin 3.9 3.4 - 5.0 g/dL    Globulin  3.3 2.8 - 4.4 g/dL    A/G Ratio 1.2 1.0 - 2.0    Patient Fasting for CMP? No      *Note: Due to a large number of results and/or encounters for the requested time period, some results have not been displayed. A complete set of results can be found in Results Review.                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[facility])    [address], [address], Daiana Briscoe MD    Office Visit    4 months ago Encounter for annual health examination    Va Fajardo MD    Office Visit    6 months ago History of laryngeal cancer    Daiana Caballero MD    Office Visit    6 months ago Essential hypertension    Va Fajardo MD    Office Visit    9 months ago History of laryngeal cancer    Daiana Caballero MD    Office Visit          Future Appointments         Provider Department Appt Notes    In 2 weeks MD Miladis Davies [place] Follow up                    Passed - [facility] or GFRNAA > 50     GFR Evaluation  EGFRCR: 88 , resulted on 4/15/2023                Future Appointments         Provider Department Appt Notes    In 2 weeks MD Miladis Davies [place] Follow up             Recent Outpatient Visits              2 months ago 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

## 2023-10-10 ENCOUNTER — OFFICE VISIT (OUTPATIENT)
Dept: OTOLARYNGOLOGY | Facility: CLINIC | Age: 68
End: 2023-10-10

## 2023-10-10 VITALS — WEIGHT: 243 LBS | BODY MASS INDEX: 34.79 KG/M2 | HEIGHT: 70 IN

## 2023-10-10 DIAGNOSIS — J38.3 POLYPOID DEGENERATION OF VOCAL CORDS: ICD-10-CM

## 2023-10-10 DIAGNOSIS — C32.0 VOCAL CORD CANCER (HCC): Primary | ICD-10-CM

## 2023-10-10 PROCEDURE — 31575 DIAGNOSTIC LARYNGOSCOPY: CPT | Performed by: SPECIALIST

## 2023-10-10 PROCEDURE — 99213 OFFICE O/P EST LOW 20 MIN: CPT | Performed by: SPECIALIST

## 2023-10-10 PROCEDURE — 3008F BODY MASS INDEX DOCD: CPT | Performed by: SPECIALIST

## 2023-10-10 PROCEDURE — 1160F RVW MEDS BY RX/DR IN RCRD: CPT | Performed by: SPECIALIST

## 2023-10-10 PROCEDURE — 1159F MED LIST DOCD IN RCRD: CPT | Performed by: SPECIALIST

## 2023-10-11 NOTE — PATIENT INSTRUCTIONS
No evidence of recurrent or metastatic cancer on the day to your visit. Follow-up in 4 months time, sooner if problems. Small polypoid area on the left posterior vocal cord of no concern.

## 2024-01-05 ENCOUNTER — OFFICE VISIT (OUTPATIENT)
Dept: INTERNAL MEDICINE CLINIC | Facility: CLINIC | Age: 69
End: 2024-01-05
Payer: MEDICARE

## 2024-01-05 VITALS
SYSTOLIC BLOOD PRESSURE: 156 MMHG | DIASTOLIC BLOOD PRESSURE: 80 MMHG | RESPIRATION RATE: 20 BRPM | HEART RATE: 86 BPM | WEIGHT: 235 LBS | HEIGHT: 70 IN | TEMPERATURE: 98 F | BODY MASS INDEX: 33.64 KG/M2

## 2024-01-05 DIAGNOSIS — Z85.21 HISTORY OF LARYNGEAL CANCER: ICD-10-CM

## 2024-01-05 DIAGNOSIS — I10 ESSENTIAL HYPERTENSION: Primary | ICD-10-CM

## 2024-01-05 PROCEDURE — 3079F DIAST BP 80-89 MM HG: CPT | Performed by: INTERNAL MEDICINE

## 2024-01-05 PROCEDURE — 1126F AMNT PAIN NOTED NONE PRSNT: CPT | Performed by: INTERNAL MEDICINE

## 2024-01-05 PROCEDURE — 1160F RVW MEDS BY RX/DR IN RCRD: CPT | Performed by: INTERNAL MEDICINE

## 2024-01-05 PROCEDURE — 3008F BODY MASS INDEX DOCD: CPT | Performed by: INTERNAL MEDICINE

## 2024-01-05 PROCEDURE — 99214 OFFICE O/P EST MOD 30 MIN: CPT | Performed by: INTERNAL MEDICINE

## 2024-01-05 PROCEDURE — 1159F MED LIST DOCD IN RCRD: CPT | Performed by: INTERNAL MEDICINE

## 2024-01-05 PROCEDURE — 3077F SYST BP >= 140 MM HG: CPT | Performed by: INTERNAL MEDICINE

## 2024-01-05 RX ORDER — LOSARTAN POTASSIUM 100 MG/1
100 TABLET ORAL DAILY
Qty: 90 TABLET | Refills: 1 | Status: SHIPPED | OUTPATIENT
Start: 2024-01-05

## 2024-01-05 RX ORDER — AMLODIPINE BESYLATE 10 MG/1
10 TABLET ORAL DAILY
Qty: 90 TABLET | Refills: 1 | Status: SHIPPED | OUTPATIENT
Start: 2024-01-05

## 2024-01-05 RX ORDER — HYDRALAZINE HYDROCHLORIDE 50 MG/1
50 TABLET, FILM COATED ORAL 2 TIMES DAILY
Qty: 180 TABLET | Refills: 3 | Status: SHIPPED | OUTPATIENT
Start: 2024-01-05

## 2024-01-05 RX ORDER — HYDROCHLOROTHIAZIDE 25 MG/1
25 TABLET ORAL DAILY
Qty: 90 TABLET | Refills: 1 | Status: SHIPPED | OUTPATIENT
Start: 2024-01-05
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
## 2024-01-05 NOTE — PATIENT INSTRUCTIONS
Controlling High Blood Pressure   High blood pressure (hypertension) is often called the silent killer. This is because many people who have it, don’t know it. It can be very dangerous. High blood pressure can raise your risk of heart attack, stroke, heart disease, and heart failure. Controlling your blood pressure can lower your risk of these problems. It's important to check yourblood pressure regularly. It can save your life.   Blood pressure measurements are given as 2 numbers. Systolic blood pressure is the upper number. This is the pressure when the heart contracts. Diastolic blood pressure is the lower number. This is the pressure when the heartrelaxes between beats.   Blood pressure is grouped like this:   Normal blood pressure. This is systolic of less than 120 and diastolic of less than 80 (120/80).  Elevated blood pressure.  This is systolic of 120 to 129 and diastolic less than 80.  Stage 1 high blood pressure.  This is systolic of 130 to 139 or diastolic between 80 to 89.  Stage 2 high blood pressure.  This is systolic of 140 or higher or diastolic of 90 or higher.  A heart-healthy lifestyle can help you control your blood pressure withoutmedicines. Below are some things you can do to have a heart-healthy lifestyle.     Eat heart-healthy foods   Choose low-salt, low-fat foods. Limit your sodium to 2,300 mg per day or the amount advised by your healthcare provider.  Limit canned, dried, cured, packaged, and fast foods. These can contain a lot of salt.  Eat 8 to 10 servings of fruits and vegetables every day.  Choose lean meats, fish, or chicken.  Eat whole-grain pasta, brown rice, and beans.  Eat 2 to 3 servings of low-fat or fat-free dairy products.  Ask your doctor about the DASH eating plan. This plan helps reduce blood pressure.  When you go to a restaurant, ask that your meal be made with no added salt.    Stay at a healthy weight   Ask your healthcare provider how many calories to eat a day. Then  stick to that number.  Ask your provider what weight range is healthiest for you. If you are overweight, a weight loss of only 3% to 5% of your body weight can help lower blood pressure. A good weight loss goal is to lose 10% of your body weight in a year.  Limit snacks and sweets.  Get regular exercise.    Get more active   Find activities you enjoy. They can be done alone or with friends or family. Try bicycling, dancing, walking, or jogging.  Park farther away from building entrances to walk more.  Use stairs instead of the elevator.  When you can, walk or bike instead of driving.  [place] leaves, garden, or do household repairs.  Be active at a moderate to vigorous level of physical activity for at least 30 minutes a day for at least 5 days a week.     Manage stress   Make time to relax and enjoy life. Find time to laugh.  Talk about your concerns with your loved ones and your healthcare provider.  Visit with family and friends, and keep up with hobbies.    Limit alcohol and quit smoking   Men should have no more than 2 drinks per day.  Women should have no more than 1 drink per day.  If you smoke, make a plan to stop. Talk with your healthcare provider for help. Smoking greatly raises your risk for heart disease and stroke. Ask your provider about stop-smoking programs and other support.    Blood pressure medicines  If your lifestyle changes aren’t enough, your healthcare provider may prescribe high blood pressure medicine. Take all medicines as prescribed. If you have any questions about yourmedicines, ask your provider before stopping or changing them.   [employer] last reviewed this educational content on12/1/2021 © 2000-2022 [employer]. All rights reserved. This information is not intended as a substitute for professional medical care. Always follow yourUniversity Hospitals Health Systemcare professional's instruction    Video [employer]™  What is High Blood Pressure?  Understand what blood pressure is, the health risks  of having high blood pressure, the factors that put you at risk for having high blood pressure, and the importance of working with your healthcare provider to control it.  To watch the video:  Scan the QR code  Using your mobile device, scan the following code:  OR  Go to the website:  [employer]  Enter the prescription code:  3414E    © 2000-2022 [employer]. All rights reserved. This information is not intended as a substitute for professional medical care. Always follow your healthcare professional's instructions.    Video [employer]  Eating Well with High Blood Pressure  Certain foods can make your blood pressure go too high. Watch and learn how easy it is to have delicious meals without harming your health.     To watch the video:  Scan the QR code  Using your mobile device, scan the following code:  OR  Go to the website:  www.kramesvideo.com  Enter the prescription code:   QIX       © 2000-2022 [employer]. All rights reserved. This information is not intended as a substitute for professional medical care. Always follow your healthcare professional's instructions.    Taking Your Blood Pressure  Blood pressure is the force of blood against the artery wall as it moves from the heart through the blood vessels. You can take your own blood pressure reading using a digital monitor. Take your readings the same each time, usingthe same arm. Take readings as often as your healthcare provider advises.   About blood pressure monitors  Blood pressure monitors are designed for certain ages and cases. You can find monitors for older adults, for pregnant women, and for children. Make sure theone you choose is the right one for your age and situation.   Experts advise an automatic cuff monitor that fits on your upper arm (bicep). The cuff should fit your arm size. A cuff that’s too large or too small won't give an accurate reading. Measure around yourupper arm to find your  size.   Monitors that attach to your finger or wrist are not as accurate as monitorsfor your upper arm.   Ask your healthcare provider for help in choosing a monitor. Bring your monitorto your next provider visit if you need help in using it the correct way.   The steps below are general instructions for using an automatic digitalmonitor.   Step 1. Relax    Take your blood pressure at the same time every day, such as in the morning or evening. Or take it at the time your healthcare provider advises.  Wait at least 30 minutes after smoking, eating, or exercising. Don't drink coffee, tea, soda, or other caffeinated drinks before checking your blood pressure. Use the restroom beforehand.  Sit comfortably at a table with both feet on the floor. Don't cross your legs or feet. Place the monitor near you.  Rest for at least 5 minutes before you begin. Make sure there are no distractions. This includes TV, cell phones, and other electronics. Wait to have conversations with others until after you measure you blood pressure.  Step 2. Wrap the cuff    Place your arm on the table, palm up. Your arm should be at the level of your heart. Wrap the cuff around your upper arm, just above your elbow. It’s best done on bare skin, not over clothing. Most cuffs will show you where the blood vessel in the middle of the arm at the inner side of the elbow (the brachial artery) should line up with the cuff. Look in your monitor's instruction booklet for an illustration. You can also bring your cuff to your healthcare provider and have them show you how to correctly place the cuff.  Step 3. Inflate the cuff    Push the button that starts the pump.  The cuff will tighten, then loosen.  The numbers will change. When they stop changing, your blood pressure reading will appear.  Take 2 or 3 readings 1 minute apart, or as advised by your provider.  Step 4. Write down the results of each reading    Write down your blood pressure numbers for each  reading. Note the date and time. Keep your results in 1 place, such as a notebook. Even if your monitor has a built-in memory, keep a hard copy of the readings.  Remove the cuff from your arm. Turn off the machine.  Bring your blood pressure records with you to each provider visit.  If you start a new blood pressure medicine, note the day you started the new medicine. Also note the day if you change the dose of your medicine. Measure your blood pressure before your take your medicine. This information goes on your blood pressure recording sheet. This will help your provider check how well the medicine changes are working.  Ask your provider what numbers mean that you should call them. Also ask what numbers mean that you should get help right away.  Carlos last reviewed this educational content on12/1/2021 © 2000-2022 [employer]. All rights reserved. This information is not intended as a substitute for professional medical care. Always follow yourhealthcare professional's instructions.

## 2024-02-13 ENCOUNTER — OFFICE VISIT (OUTPATIENT)
Dept: OTOLARYNGOLOGY | Facility: CLINIC | Age: 69
End: 2024-02-13
Payer: MEDICARE

## 2024-02-13 VITALS — WEIGHT: 235 LBS | TEMPERATURE: 97 F | BODY MASS INDEX: 33.64 KG/M2 | HEIGHT: 70 IN

## 2024-02-13 DIAGNOSIS — C32.0 VOCAL CORD CANCER (HCC): Primary | ICD-10-CM

## 2024-02-13 PROCEDURE — 99213 OFFICE O/P EST LOW 20 MIN: CPT | Performed by: SPECIALIST

## 2024-02-13 PROCEDURE — 3008F BODY MASS INDEX DOCD: CPT | Performed by: SPECIALIST

## 2024-02-13 PROCEDURE — 1159F MED LIST DOCD IN RCRD: CPT | Performed by: SPECIALIST

## 2024-02-13 PROCEDURE — 1160F RVW MEDS BY RX/DR IN RCRD: CPT | Performed by: SPECIALIST
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
## 2024-03-30 NOTE — TELEPHONE ENCOUNTER
Pt notified with understanding. Will obtain rx and OTC Claritin. Will keep clinic apprised of his progress.
Pt was seen 11/29/18 and prescribed azithromycin which he completed yesterday. Pt states still having PND that causes him to cough; coughs up green phlegm at times. Not taking anything OTC for symptoms.  Denies fever, chills, chest pain, SOB, other symptom
Start on Azelastine nasal spray, can be used in conjunction with Flonase. Start on Claritin/Loratadine once daily OTC (regular, not Claritin-D as this will increase blood pressure). If no improvement in the next few days, consider addition of singulair.
pulse oximetry

## 2024-04-26 ENCOUNTER — LAB ENCOUNTER (OUTPATIENT)
Dept: LAB | Facility: HOSPITAL | Age: 69
End: 2024-04-26
Attending: INTERNAL MEDICINE
Payer: COMMERCIAL

## 2024-04-26 PROCEDURE — 80053 COMPREHEN METABOLIC PANEL: CPT | Performed by: INTERNAL MEDICINE

## 2024-04-26 PROCEDURE — 36415 COLL VENOUS BLD VENIPUNCTURE: CPT | Performed by: INTERNAL MEDICINE

## 2024-04-26 PROCEDURE — 85025 COMPLETE CBC W/AUTO DIFF WBC: CPT | Performed by: INTERNAL MEDICINE

## 2024-04-26 PROCEDURE — 80061 LIPID PANEL: CPT | Performed by: INTERNAL MEDICINE

## 2024-04-26 PROCEDURE — 84443 ASSAY THYROID STIM HORMONE: CPT | Performed by: INTERNAL MEDICINE

## 2024-05-03 ENCOUNTER — OFFICE VISIT (OUTPATIENT)
Dept: INTERNAL MEDICINE CLINIC | Facility: CLINIC | Age: 69
End: 2024-05-03
Payer: MEDICARE

## 2024-05-03 VITALS
DIASTOLIC BLOOD PRESSURE: 68 MMHG | OXYGEN SATURATION: 95 % | HEART RATE: 75 BPM | HEIGHT: 70 IN | BODY MASS INDEX: 34.41 KG/M2 | RESPIRATION RATE: 17 BRPM | WEIGHT: 240.38 LBS | SYSTOLIC BLOOD PRESSURE: 153 MMHG

## 2024-05-03 DIAGNOSIS — Z00.00 ENCOUNTER FOR ANNUAL HEALTH EXAMINATION: Primary | ICD-10-CM

## 2024-05-03 DIAGNOSIS — M19.90 OSTEOARTHRITIS, UNSPECIFIED OSTEOARTHRITIS TYPE, UNSPECIFIED SITE: ICD-10-CM

## 2024-05-03 DIAGNOSIS — I10 HYPERTENSION, UNSPECIFIED TYPE: ICD-10-CM

## 2024-05-03 DIAGNOSIS — Z85.21 HISTORY OF LARYNGEAL CANCER: ICD-10-CM

## 2024-05-03 DIAGNOSIS — I10 WHITE COAT SYNDROME WITH DIAGNOSIS OF HYPERTENSION: ICD-10-CM

## 2024-05-03 DIAGNOSIS — Z13.6 SCREENING, HEART DISEASE, ISCHEMIC: ICD-10-CM

## 2024-05-03 NOTE — PATIENT INSTRUCTIONS
Warner Hare's SCREENING SCHEDULE   Tests on this list are recommended by your physician but may not be covered, or covered at this frequency, by your insurer.   Please check with your insurance carrier before scheduling to verify coverage.   PREVENTATIVE SERVICES FREQUENCY &  COVERAGE DETAILS LAST COMPLETION DATE   Diabetes Screening    Fasting Blood Sugar / Glucose    One screening every 12 months if never tested or if previously tested but not diagnosed with pre-diabetes   One screening every 6 months if diagnosed with pre-diabetes Lab Results   Component Value Date    GLU 91 04/26/2024        Cardiovascular Disease Screening    Lipid Panel  Cholesterol  Lipoprotein (HDL)  Triglycerides Covered every 5 years for all Medicare beneficiaries without apparent signs or symptoms of cardiovascular disease Lab Results   Component Value Date    CHOLEST 183 04/26/2024    HDL 37 (L) 04/26/2024     (H) 04/26/2024    TRIG 115 04/26/2024         Electrocardiogram (EKG)   Covered if needed at [facility] to Medicare, and non-screening if indicated for medical reasons 12/22/2021      Ultrasound Screening for Abdominal Aortic Aneurysm (AAA) Covered once in a lifetime for one of the following risk factors   • Men who are 65-75 years old and have ever smoked   • Anyone with a family history -     Colorectal Cancer Screening  Covered for ages 50-85; only need ONE of the following:    Colonoscopy   Covered every 10 years    Covered every 2 years if patient is at high risk or previous colonoscopy was abnormal 01/31/2022    Health Maintenance   Topic Date Due   • Colorectal Cancer Screening  01/31/2027       Flexible Sigmoidoscopy   Covered every 4 years -    Fecal Occult Blood Test Covered annually -   Prostate Cancer Screening    Prostate-Specific Antigen (PSA) Annually Lab Results   Component Value Date    PSA 1.0 06/09/2018     Health Maintenance   Topic Date Due   • PSA  04/26/2026      Immunizations    Influenza Covered  once per flu season  Please get every year -  No recommendations at this time    Pneumococcal Each vaccine ([id number] & [id number]) covered once after 65 Prevnar 13: -    Wfsrtwwqu00: -     No recommendations at this time    Hepatitis B One screening covered for patients with certain risk factors   -  No recommendations at this time    Tetanus Toxoid Not covered by Medicare Part B unless medically necessary (cut with metal); may be covered with your pharmacy prescription benefits -    Tetanus, Diptheria and Pertusis TD and TDaP Not covered by Medicare Part B -  No recommendations at this time    Zoster Not covered by Medicare Part B; may be covered with your pharmacy  prescription benefits -  No recommendations at this time     Annual Monitoring of Persistent Medications (ACE/ARB, digoxin diuretics, anticonvulsants)    Potassium Annually Lab Results   Component Value Date    K 3.8 04/26/2024         Creatinine   Annually Lab Results   Component Value Date    CREATSERUM 0.91 04/26/2024         BUN Annually Lab Results   Component Value Date    BUN 18 04/26/2024       Drug Serum Conc Annually No results found for: \"DIGOXIN\", \"DIG\", \"VALP\"

## 2024-05-03 NOTE — PROGRESS NOTES
Subjective:   Warner Hare is a 68 year old male who presents for a MA (Medicare Advantage) Supervisit (Once per calendar year) and scheduled follow up of multiple significant but stable problems.     Patient is here requesting Medicare annual wellness visit and follow-up on chronic medical issues as listed below.  Last seen in the office on January 5.  At that time blood pressure 148/74.  We did not make any changes.  Since that time he has seen the orthopedic doctor for left shoulder pain.  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  Current medications reviewed.  Health maintenance and vaccination status reviewed.  Advanced directives reviewed.    Patient had full blood work done on April 26.  Labs were all normal.  No prior heart evaluation.    The left shoulder is going well. Hx of arthroscopic procedure in late January; he is going to PT; it is 90% back to normal. No active complaints. He was in [place] recently. Diet is good. For exercise, he does stretching daily. [facility]. Patient does check blood pressure at home. It has been running 109-135/52-70. No tobacco or EtOH.     History/Other:   Fall Risk Assessment:   He has been screened for Falls and is low risk.      Cognitive Assessment:   He had a completely normal cognitive assessment - see flowsheet entries     Functional Ability/Status:   Warner Hare has a completely normal functional assessment. See flowsheet for details.        Depression Screening (PHQ-2/PHQ-9): PHQ-2 SCORE: 0  , done 5/3/2024             Advanced Directives:   He does NOT have a Living Will. [Do you have a living will?: No]  He does NOT have a Power of  for Health Care. [Do you have a healthcare power of ?: No]  Discussed Advance Care Planning with patient (and family/surrogate if present). Standard forms made available to patient in After Visit Summary.      Patient Active  Problem List   Diagnosis    HTN (hypertension)    History of laryngeal cancer     Allergies:  He has No Known Allergies.    Current Medications:  Outpatient Medications Marked as Taking for the 5/3/24 encounter (Office Visit) with Asim Ziegler MD   Medication Sig    amLODIPine 10 MG Oral Tab Take 1 tablet (10 mg total) by mouth daily.    hydrALAZINE 50 MG Oral Tab Take 1 tablet (50 mg total) by mouth 2 (two) times daily.    hydroCHLOROthiazide 25 MG Oral Tab Take 1 tablet (25 mg total) by mouth daily.    losartan 100 MG Oral Tab Take 1 tablet (100 mg total) by mouth daily.    Multiple Vitamins-Minerals (MULTI-VITAMIN/MINERALS) Oral Tab Take 1 tablet by mouth daily.       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  Surgical History:  He  has a past surgical history that includes colonoscopy screening - referral (N/A, 1/24/2017); larynx surg proc unlisted (01/06/2020); hc arthrocentesis or inject intermed joint w us (Right); knee scope,med/lat menisectomy (Right, 01/2019); Umbilical hernia repair (10/27/2020); colonoscopy (02/2022); colonoscopy; and Fracture surgery (Left, 01/25/2021).   Family History:  His family history is not on file.  Social History:  He  reports that he has never smoked. He has never been exposed to tobacco smoke. He has never used smokeless tobacco. He reports that he does not drink alcohol and does not use drugs.    Tobacco:  He has never smoked tobacco.    CAGE Alcohol Screen:   CAGE screening score of 0 on 5/3/2024, showing low risk of alcohol abuse.      Patient Care Team:  Asim Ziegler MD as PCP - General (Internal  Medicine)  Alejandro Goldman, YOJANA as Physical Therapist (Physical Therapy)  Briana Smith PTA as Physical Therapist (Physical Therapy)  Bhargav Donis MD (Radiation Oncology)  Vira Ace, RN as Registered Nurse (Registered Nurse)  Romero, Griselle, Kalli Stephens MD (Radiation Oncology)  Katty Boyer, ASHER    Review of Systems       Objective:   Physical Exam  Constitutional:       Appearance: Normal appearance. He is well-developed. He is obese.   HENT:      Right Ear: Tympanic membrane and ear canal normal.      Left Ear: Tympanic membrane and ear canal normal.      Nose: Nose normal.      Mouth/Throat:      Pharynx: No oropharyngeal exudate or posterior oropharyngeal erythema.   Eyes:      Conjunctiva/sclera: Conjunctivae normal.      Pupils: Pupils are equal, round, and reactive to light.   Neck:      Thyroid: No thyromegaly.      Vascular: No carotid bruit.   Cardiovascular:      Rate and Rhythm: Normal rate and regular rhythm.      Pulses: Normal pulses.      Heart sounds: Normal heart sounds. No murmur heard.  Pulmonary:      Effort: Pulmonary effort is normal.      Breath sounds: Normal breath sounds. No wheezing or rales.   Abdominal:      General: Bowel sounds are normal.      Palpations: Abdomen is soft. There is no mass.      Tenderness: There is no abdominal tenderness.      Hernia: There is no hernia in the left inguinal area.   Genitourinary:     Penis: Normal and circumcised.       Testes: Normal.   Musculoskeletal:      Right lower leg: No edema.      Left lower leg: No edema.   Lymphadenopathy:      Cervical: No cervical adenopathy.   Skin:     General: Skin is warm and dry.      Findings: No rash.   Neurological:      General: No focal deficit present.      Mental Status: He is alert.      Cranial Nerves: No cranial nerve deficit.      Coordination: Coordination normal.   Psychiatric:         Mood and Affect: Mood normal.         Behavior: Behavior normal.         Thought Content:  Thought content normal.         Judgment: Judgment normal.          /68   Pulse 75   Resp 17   Ht 5' 10\" (1.778 m)   Wt 240 lb 6 oz (109 kg)   SpO2 95%   BMI 34.49 kg/m²  Estimated body mass index is 34.49 kg/m² as calculated from the following:    Height as of this encounter: 5' 10\" (1.778 m).    Weight as of this encounter: 240 lb 6 oz (109 kg).    Medicare Hearing Assessment:   Hearing Screening    Screening Method: Finger Rub  Finger Rub Result: Pass         Visual Acuity:   Right Eye Visual Acuity: Uncorrected Right Eye Chart Acuity: 20/70   Left Eye Visual Acuity: Uncorrected Left Eye Chart Acuity: 20/100   Both Eyes Visual Acuity: Uncorrected Both Eyes Chart Acuity: 20/70   Able To Tolerate Visual Acuity: Yes        Assessment & Plan:   Warner Hare is a 68 year old male who presents for a Medicare Assessment.     1. Encounter for annual health examination  Physical exam remarkable for overweight status as well as blood pressure issues as below.  Encouraged healthy diet and regular exercise.  No need for blood work right now.    2. Hypertension, unspecified type  Blood pressure elevated in the office but much better at home.  Continue same medications.  Work on diet and exercise.    3. White coat syndrome with diagnosis of hypertension  White coat syndrome with hypertension.  Elevated today but not overly elevated.  Continue same medications.    4. History of laryngeal cancer  No evidence of recurrence.  Follow-up with ENT.    5. Osteoarthritis, unspecified osteoarthritis type, unspecified site  Stable.  Continue current treatment.    6. Screening, heart disease, ischemic  Multiple risk factors for coronary artery disease.  Various options discussed for screening.  He is asymptomatic.  Will check cardiac calcium scan.  - CT CALCIUM SCORING; Future    The patient indicates understanding of these issues and agrees to the plan.  Reinforced healthy diet, lifestyle, and exercise.      No  follow-ups on file.     Asim Ziegler MD, 5/3/2024     Supplementary Documentation:   General Health:  In the past six months, have you lost more than 10 pounds without trying?: 2 - No  Has your appetite been poor?: No  Type of Diet: Balanced  How does the patient maintain a good energy level?: Appropriate Exercise;Daily Walks;Stretching  How would you describe your daily physical activity?: Moderate  How would you describe your current health state?: Good  How do you maintain positive mental well-being?: Social Interaction  On a scale of 0 to 10, with 0 being no pain and 10 being severe pain, what is your pain level?: 0 - (None)  In the past six months, have you experienced urine leakage?: 0-No  At any time do you feel concerned for the safety/well-being of yourself and/or your children, in your home or elsewhere?: No  Have you had any immunizations at another office such as Influenza, Hepatitis B, Tetanus, or Pneumococcal?: No        Warner Hare's SCREENING SCHEDULE   Tests on this list are recommended by your physician but may not be covered, or covered at this frequency, by your insurer.   Please check with your insurance carrier before scheduling to verify coverage.   PREVENTATIVE SERVICES FREQUENCY &  COVERAGE DETAILS LAST COMPLETION DATE   Diabetes Screening    Fasting Blood Sugar / Glucose    One screening every 12 months if never tested or if previously tested but not diagnosed with pre-diabetes   One screening every 6 months if diagnosed with pre-diabetes Lab Results   Component Value Date    GLU 91 04/26/2024        Cardiovascular Disease Screening    Lipid Panel  Cholesterol  Lipoprotein (HDL)  Triglycerides Covered every 5 years for all Medicare beneficiaries without apparent signs or symptoms of cardiovascular disease Lab Results   Component Value Date    CHOLEST 183 04/26/2024    HDL 37 (L) 04/26/2024     (H) 04/26/2024    TRIG 115 04/26/2024         Electrocardiogram (EKG)   Covered if  needed at [facility] to Medicare, and non-screening if indicated for medical reasons 12/22/2021      Ultrasound Screening for Abdominal Aortic Aneurysm (AAA) Covered once in a lifetime for one of the following risk factors    Men who are 65-75 years old and have ever smoked    Anyone with a family history -     Colorectal Cancer Screening  Covered for ages 50-85; only need ONE of the following:    Colonoscopy   Covered every 10 years    Covered every 2 years if patient is at high risk or previous colonoscopy was abnormal 01/31/2022    Health Maintenance   Topic Date Due    Colorectal Cancer Screening  01/31/2027       Flexible Sigmoidoscopy   Covered every 4 years -    Fecal Occult Blood Test Covered annually -   Prostate Cancer Screening    Prostate-Specific Antigen (PSA) Annually Lab Results   Component Value Date    PSA 1.0 06/09/2018     Health Maintenance   Topic Date Due    PSA  04/26/2026      Immunizations    Influenza Covered once per flu season  Please get every year -  No recommendations at this time    Pneumococcal Each vaccine ([id number] & [id number]) covered once after 65 Prevnar 13: -    [id number]: -     No recommendations at this time    Hepatitis B One screening covered for patients with certain risk factors   -  No recommendations at this time    Tetanus Toxoid Not covered by Medicare Part B unless medically necessary (cut with metal); may be covered with your pharmacy prescription benefits -    Tetanus, Diptheria and Pertusis TD and TDaP Not covered by Medicare Part B -  No recommendations at this time    Zoster Not covered by Medicare Part B; may be covered with your pharmacy  prescription benefits -  No recommendations at this time     Annual Monitoring of Persistent Medications (ACE/ARB, digoxin diuretics, anticonvulsants)    Potassium Annually Lab Results   Component Value Date    K 3.8 04/26/2024         Creatinine   Annually Lab Results   Component Value Date    CREATSERUM 0.91 04/26/2024          BUN Annually Lab Results   Component Value Date    BUN 18 04/26/2024       Drug Serum Conc Annually No results found for: \"DIGOXIN\", \"DIG\", \"VALP\"

## 2024-08-22 ENCOUNTER — HOSPITAL ENCOUNTER (OUTPATIENT)
Dept: CT IMAGING | Age: 69
Discharge: HOME | End: 2024-08-22
Attending: INTERNAL MEDICINE

## 2024-08-22 DIAGNOSIS — Z13.6 SCREENING, HEART DISEASE, ISCHEMIC: ICD-10-CM

## 2024-08-23 ENCOUNTER — OFFICE VISIT (OUTPATIENT)
Dept: OTOLARYNGOLOGY | Facility: CLINIC | Age: 69
End: 2024-08-23

## 2024-08-23 VITALS — WEIGHT: 240 LBS | HEIGHT: 70 IN | BODY MASS INDEX: 34.36 KG/M2

## 2024-08-23 DIAGNOSIS — Z85.21 HISTORY OF LARYNGEAL CANCER: Primary | ICD-10-CM

## 2024-08-23 PROCEDURE — 1159F MED LIST DOCD IN RCRD: CPT | Performed by: SPECIALIST

## 2024-08-23 PROCEDURE — 99213 OFFICE O/P EST LOW 20 MIN: CPT | Performed by: SPECIALIST

## 2024-08-23 PROCEDURE — 1160F RVW MEDS BY RX/DR IN RCRD: CPT | Performed by: SPECIALIST

## 2024-08-23 PROCEDURE — 3008F BODY MASS INDEX DOCD: CPT | Performed by: SPECIALIST
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
## 2024-08-23 NOTE — PATIENT INSTRUCTIONS
There was no evidence of recurrent or metastatic cancer.  Follow-up in 6 months time, sooner if problems.

## 2024-10-02 RX ORDER — AMLODIPINE BESYLATE 10 MG/1
10 TABLET ORAL DAILY
Qty: 90 TABLET | Refills: 3 | Status: SHIPPED | OUTPATIENT
Start: 2024-10-02

## 2024-10-02 RX ORDER — LOSARTAN POTASSIUM 100 MG/1
100 TABLET ORAL DAILY
Qty: 90 TABLET | Refills: 3 | Status: SHIPPED | OUTPATIENT
Start: 2024-10-02

## 2024-10-02 RX ORDER — HYDROCHLOROTHIAZIDE 25 MG/1
25 TABLET ORAL DAILY
Qty: 90 TABLET | Refills: 3 | Status: SHIPPED | OUTPATIENT
Start: 2024-10-02

## 2024-10-02 NOTE — TELEPHONE ENCOUNTER
Please review; protocol failed/No Protocol    Future Appointments   Date Time Provider Department Center   12/9/2024  3:20 PM [facility] MD [facility]       Requested Prescriptions   Pending Prescriptions Disp Refills    losartan 100 MG Oral Tab 90 tablet 1     Sig: Take 1 tablet (100 mg total) by mouth daily.       Hypertension Medications Protocol Failed - 10/2/2024  3:46 PM        Failed - Last BP reading less than 140/90     BP Readings from Last 1 Encounters:   05/03/24 153/68               Passed - CMP or BMP in past 12 months        Passed - In person appointment or virtual visit in the past 12 mos or appointment in next 3 mos     Recent Outpatient Visits              1 month ago History of laryngeal cancer    [facility] Paula Ashley MD    Office Visit    5 months ago Encounter for annual health examination    [facility] Asim Ziegler MD    Office Visit    7 months ago Vocal cord cancer (HCC)    [facility], Paula Doll MD    Office Visit    9 months ago Essential hypertension    [facility] Asim Ziegler MD    Office Visit    11 months ago Vocal cord cancer (HCC)    [facility] Paula Doll MD    Office Visit          Future Appointments         Provider Department Appt Notes    In 2 months Asim Ziegler MD [facility] Blood pressure check                    Passed - EGFRCR or GFRNAA > 50     GFR Evaluation  EGFRCR: 93 , resulted on 4/26/2024            amLODIPine 10 MG Oral Tab 90 tablet 1     Sig: Take 1 tablet (10 mg total) by mouth daily.       Hypertension Medications Protocol Failed - 10/2/2024  3:46 PM        Failed - Last BP reading less than 140/90     BP Readings from Last 1 Encounters:   05/03/24 153/68                Passed - CMP or BMP in past 12 months        Passed - In person appointment or virtual visit in the past 12 mos or appointment in next 3 mos     Recent Outpatient Visits              1 month ago History of laryngeal cancer    [facility] Paula Ashley MD    Office Visit    5 months ago [facility] for [facility] health [facility]    [facility]    7 months [facility]    [facility]    9 months ago [facility]    [facility] Visit    11 months ago [facility]ocal [facility]HCC[facility]    [facility]          Future Appointments         Provider Department Appt Notes    In 2 months Asim Ziegler MD [facility] Blood pressure check                    Passed - EGFRCR or GFRNAA > 50     GFR Evaluation  EGFRCR: 93 , resulted on 4/26/2024            hydroCHLOROthiazide 25 MG Oral Tab 90 tablet 1     Sig: Take 1 tablet (25 mg total) by mouth daily.       Hypertension Medications Protocol Failed - 10/2/2024  3:46 PM        Failed - Last BP reading less than 140/90     BP Readings from Last 1 Encounters:   05/03/24 153/68               Passed - CMP or BMP in past 12 months        Passed - In person appointment or virtual visit in the past 12 mos or appointment in next 3 mos     Recent Outpatient Visits              1 month ago History of laryngeal cancer    [facility] Paula Ashley MD    Office Visit    5 months ago Encounter for annual health examination    [facility]    [facility] Visit    7 months ago Vocal cord  cancer (HCC)    [facility], Paula Doll MD    Office Visit    9 months ago [facility]ential hypertension    [facility]    Office Visit    11 months ago Vocal cord cancer (HCC)    [facility], Paula Doll MD    Office Visit          Future Appointments         Provider Department Appt Notes    In 2 months Asim Ziegler MD [facility] Blood pressure check                    Passed - EGFRCR or GFRNAA > 50     GFR Evaluation  EGFRCR: 93 , resulted on 4/26/2024             Future Appointments         Provider Department Appt Notes    In 2 months Asim Ziegler MD [facility] Blood pressure check          Recent Outpatient Visits              1 month ago History of laryngeal cancer    [facility] Paula Noel MD    Office Visit    5 months ago Encounter for annual health examination    [facility]    Office Visit    7 months ago Vocal cord cancer (HCC)    [facility] Paula Doll MD    Office Visit    9 months ago Essential hypertension    [facility]    Office Visit    11 months ago Vocal cord cancer (HCC)    [facility] Paula Doll MD    Office Visit

## 2024-12-09 ENCOUNTER — OFFICE VISIT (OUTPATIENT)
Facility: CLINIC | Age: 69
End: 2024-12-09
Payer: MEDICARE

## 2024-12-09 VITALS
HEART RATE: 86 BPM | RESPIRATION RATE: 20 BRPM | TEMPERATURE: 98 F | SYSTOLIC BLOOD PRESSURE: 136 MMHG | DIASTOLIC BLOOD PRESSURE: 86 MMHG | HEIGHT: 70 IN | BODY MASS INDEX: 33.93 KG/M2 | WEIGHT: 237 LBS

## 2024-12-09 DIAGNOSIS — Z85.21 HISTORY OF LARYNGEAL CANCER: ICD-10-CM

## 2024-12-09 DIAGNOSIS — I10 HYPERTENSION, UNSPECIFIED TYPE: Primary | ICD-10-CM

## 2024-12-09 PROCEDURE — 1160F RVW MEDS BY RX/DR IN RCRD: CPT | Performed by: INTERNAL MEDICINE

## 2024-12-09 PROCEDURE — 1159F MED LIST DOCD IN RCRD: CPT | Performed by: INTERNAL MEDICINE

## 2024-12-09 PROCEDURE — 1125F AMNT PAIN NOTED PAIN PRSNT: CPT | Performed by: INTERNAL MEDICINE

## 2024-12-09 PROCEDURE — 3079F DIAST BP 80-89 MM HG: CPT | Performed by: INTERNAL MEDICINE

## 2024-12-09 PROCEDURE — 3075F SYST BP GE 130 - 139MM HG: CPT | Performed by: INTERNAL MEDICINE

## 2024-12-09 PROCEDURE — 99213 OFFICE O/P EST LOW 20 MIN: CPT | Performed by: INTERNAL MEDICINE

## 2024-12-09 PROCEDURE — 3008F BODY MASS INDEX DOCD: CPT | Performed by: INTERNAL MEDICINE

## 2024-12-09 PROCEDURE — G2211 COMPLEX E/M VISIT ADD ON: HCPCS | Performed by: INTERNAL MEDICINE

## 2024-12-09 RX ORDER — ROSUVASTATIN CALCIUM 20 MG/1
20 TABLET, COATED ORAL NIGHTLY
COMMUNITY
Start: 2024-10-03
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
## 2024-12-11 ENCOUNTER — LAB ENCOUNTER (OUTPATIENT)
Dept: LAB | Facility: HOSPITAL | Age: 69
End: 2024-12-11
Attending: INTERNAL MEDICINE
Payer: MEDICARE

## 2024-12-11 DIAGNOSIS — R93.1 AGATSTON CORONARY ARTERY CALCIUM SCORE GREATER THAN 400: ICD-10-CM

## 2024-12-11 DIAGNOSIS — E78.5 DYSLIPIDEMIA: Primary | ICD-10-CM

## 2024-12-11 DIAGNOSIS — I10 HYPERTENSION: ICD-10-CM

## 2024-12-11 LAB
ALBUMIN SERPL-MCNC: 4.1 G/DL (ref 3.2–4.8)
ALBUMIN/GLOB SERPL: 2 {RATIO} (ref 1–2)
ALP LIVER SERPL-CCNC: 90 U/L
ALT SERPL-CCNC: 27 U/L
ANION GAP SERPL CALC-SCNC: 7 MMOL/L (ref 0–18)
AST SERPL-CCNC: 23 U/L (ref ?–34)
BILIRUB SERPL-MCNC: 1.1 MG/DL (ref 0.2–1.1)
BUN BLD-MCNC: 15 MG/DL (ref 9–23)
BUN/CREAT SERPL: 17 (ref 10–20)
CALCIUM BLD-MCNC: 9.1 MG/DL (ref 8.7–10.4)
CHLORIDE SERPL-SCNC: 108 MMOL/L (ref 98–112)
CHOLEST SERPL-MCNC: 96 MG/DL (ref ?–200)
CK SERPL-CCNC: 101 U/L
CO2 SERPL-SCNC: 28 MMOL/L (ref 21–32)
CREAT BLD-MCNC: 0.88 MG/DL
EGFRCR SERPLBLD CKD-EPI 2021: 94 ML/MIN/1.73M2 (ref 60–?)
FASTING PATIENT LIPID ANSWER: YES
FASTING STATUS PATIENT QL REPORTED: YES
GLOBULIN PLAS-MCNC: 2.1 G/DL (ref 2–3.5)
GLUCOSE BLD-MCNC: 95 MG/DL (ref 70–99)
HDLC SERPL-MCNC: 22 MG/DL (ref 40–59)
LDLC SERPL CALC-MCNC: 57 MG/DL (ref ?–100)
NONHDLC SERPL-MCNC: 74 MG/DL (ref ?–130)
OSMOLALITY SERPL CALC.SUM OF ELEC: 297 MOSM/KG (ref 275–295)
POTASSIUM SERPL-SCNC: 3.4 MMOL/L (ref 3.5–5.1)
PROT SERPL-MCNC: 6.2 G/DL (ref 5.7–8.2)
SODIUM SERPL-SCNC: 143 MMOL/L (ref 136–145)
TRIGL SERPL-MCNC: 85 MG/DL (ref 30–149)
VLDLC SERPL CALC-MCNC: 12 MG/DL (ref 0–30)

## 2024-12-11 PROCEDURE — 36415 COLL VENOUS BLD VENIPUNCTURE: CPT

## 2024-12-11 PROCEDURE — 80053 COMPREHEN METABOLIC PANEL: CPT

## 2024-12-11 PROCEDURE — 80061 LIPID PANEL: CPT

## 2024-12-11 PROCEDURE — 82550 ASSAY OF CK (CPK): CPT

## 2025-01-27 NOTE — TELEPHONE ENCOUNTER
Zenia Chambers RN 1/31/2022 3:00 PM CST        ----- Message -----  From: Gwendloyn Gowers  Sent: 1/31/2022 2:09 PM CST  To: Em Rn Triage  Subject: Surgery     Hi,    My surgery got postponed until February 17th.  The nurse at Dr. Jeannine Valdez office said <-- Click to add NO significant Past Surgical History

## 2025-02-20 ENCOUNTER — OFFICE VISIT (OUTPATIENT)
Dept: OTOLARYNGOLOGY | Facility: CLINIC | Age: 70
End: 2025-02-20
Payer: MEDICARE

## 2025-02-20 ENCOUNTER — LAB ENCOUNTER (OUTPATIENT)
Dept: LAB | Facility: HOSPITAL | Age: 70
End: 2025-02-20
Attending: INTERNAL MEDICINE
Payer: MEDICARE

## 2025-02-20 VITALS — BODY MASS INDEX: 33.93 KG/M2 | WEIGHT: 237 LBS | HEIGHT: 70 IN

## 2025-02-20 DIAGNOSIS — E04.9 ENLARGED THYROID: Primary | ICD-10-CM

## 2025-02-20 DIAGNOSIS — E04.9 ENLARGED THYROID: ICD-10-CM

## 2025-02-20 DIAGNOSIS — Z85.21 HISTORY OF LARYNGEAL CANCER: ICD-10-CM

## 2025-02-20 LAB — TSI SER-ACNC: 4.06 UIU/ML (ref 0.55–4.78)

## 2025-02-20 PROCEDURE — 1160F RVW MEDS BY RX/DR IN RCRD: CPT | Performed by: SPECIALIST

## 2025-02-20 PROCEDURE — 99213 OFFICE O/P EST LOW 20 MIN: CPT | Performed by: SPECIALIST

## 2025-02-20 PROCEDURE — 84443 ASSAY THYROID STIM HORMONE: CPT

## 2025-02-20 PROCEDURE — 1159F MED LIST DOCD IN RCRD: CPT | Performed by: SPECIALIST

## 2025-02-20 PROCEDURE — 3008F BODY MASS INDEX DOCD: CPT | Performed by: SPECIALIST

## 2025-02-20 PROCEDURE — 36415 COLL VENOUS BLD VENIPUNCTURE: CPT

## 2025-02-20 RX ORDER — POTASSIUM CHLORIDE 1500 MG/1
TABLET, EXTENDED RELEASE ORAL
COMMUNITY
Start: 2024-12-19
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
## 2025-02-20 NOTE — PATIENT INSTRUCTIONS
No evidence of recurrent or metastatic cancer.  Follow-up in 1 years time, sooner if problems.  Your TSH was normal on the day to your visit at 4.056  Repeat TSH in 1 years time, sooner if problems.

## 2025-02-28 ENCOUNTER — LAB ENCOUNTER (OUTPATIENT)
Dept: LAB | Facility: HOSPITAL | Age: 70
End: 2025-02-28
Attending: INTERNAL MEDICINE
Payer: MEDICARE

## 2025-02-28 DIAGNOSIS — I10 ESSENTIAL HYPERTENSION, MALIGNANT: Primary | ICD-10-CM

## 2025-02-28 LAB
ANION GAP SERPL CALC-SCNC: 5 MMOL/L (ref 0–18)
BUN BLD-MCNC: 15 MG/DL (ref 9–23)
BUN/CREAT SERPL: 16.3 (ref 10–20)
CALCIUM BLD-MCNC: 9 MG/DL (ref 8.7–10.4)
CHLORIDE SERPL-SCNC: 108 MMOL/L (ref 98–112)
CO2 SERPL-SCNC: 28 MMOL/L (ref 21–32)
CREAT BLD-MCNC: 0.92 MG/DL
EGFRCR SERPLBLD CKD-EPI 2021: 91 ML/MIN/1.73M2 (ref 60–?)
FASTING STATUS PATIENT QL REPORTED: YES
GLUCOSE BLD-MCNC: 95 MG/DL (ref 70–99)
MAGNESIUM SERPL-MCNC: 2 MG/DL (ref 1.6–2.6)
OSMOLALITY SERPL CALC.SUM OF ELEC: 293 MOSM/KG (ref 275–295)
POTASSIUM SERPL-SCNC: 4.4 MMOL/L (ref 3.5–5.1)
SODIUM SERPL-SCNC: 141 MMOL/L (ref 136–145)

## 2025-02-28 PROCEDURE — 80048 BASIC METABOLIC PNL TOTAL CA: CPT

## 2025-02-28 PROCEDURE — 83735 ASSAY OF MAGNESIUM: CPT

## 2025-02-28 PROCEDURE — 36415 COLL VENOUS BLD VENIPUNCTURE: CPT

## 2025-03-18 ENCOUNTER — OFFICE VISIT (OUTPATIENT)
Dept: PULMONOLOGY | Facility: CLINIC | Age: 70
End: 2025-03-18
Payer: MEDICARE

## 2025-03-18 VITALS
BODY MASS INDEX: 33.93 KG/M2 | OXYGEN SATURATION: 96 % | SYSTOLIC BLOOD PRESSURE: 136 MMHG | HEIGHT: 70 IN | HEART RATE: 69 BPM | WEIGHT: 237 LBS | DIASTOLIC BLOOD PRESSURE: 67 MMHG

## 2025-03-18 DIAGNOSIS — G47.33 OSA (OBSTRUCTIVE SLEEP APNEA): Primary | ICD-10-CM

## 2025-03-18 PROCEDURE — 1159F MED LIST DOCD IN RCRD: CPT | Performed by: INTERNAL MEDICINE

## 2025-03-18 PROCEDURE — 99204 OFFICE O/P NEW MOD 45 MIN: CPT | Performed by: INTERNAL MEDICINE

## 2025-03-18 PROCEDURE — 3008F BODY MASS INDEX DOCD: CPT | Performed by: INTERNAL MEDICINE

## 2025-03-18 PROCEDURE — 3078F DIAST BP <80 MM HG: CPT | Performed by: INTERNAL MEDICINE

## 2025-03-18 PROCEDURE — 1126F AMNT PAIN NOTED NONE PRSNT: CPT | Performed by: INTERNAL MEDICINE

## 2025-03-18 PROCEDURE — 3075F SYST BP GE 130 - 139MM HG: CPT | Performed by: INTERNAL MEDICINE
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
## 2025-04-04 RX ORDER — HYDRALAZINE HYDROCHLORIDE 50 MG/1
50 TABLET, FILM COATED ORAL 2 TIMES DAILY
Qty: 180 TABLET | Refills: 3 | Status: SHIPPED | OUTPATIENT
Start: 2025-04-04

## 2025-04-04 NOTE — TELEPHONE ENCOUNTER
Refill passed per Clinic protocol.  Requested Prescriptions   Pending Prescriptions Disp Refills    hydrALAZINE 50 MG Oral Tab 180 tablet 3     Sig: Take 1 tablet (50 mg total) by mouth 2 (two) times daily.       Hypertension Medications Protocol Passed - 4/4/2025  1:54 PM        Passed - CMP or BMP in past 12 months        Passed - Last BP reading less than 140/90     BP Readings from Last 1 Encounters:   03/18/25 136/67               Passed - In person appointment or virtual visit in the past 12 mos or appointment in next 3 mos     Recent Outpatient Visits              2 weeks ago [facility]    [facility]    1 [facility]    [facility]    3 months ago [facility]pertension, unspecified type    [facility]    Office Visit    7 months ago History of laryngeal cancer    [facility] Paula Ashley MD    Office Visit    11 months ago Encounter for annual health examination    [facility]    Office Visit          Future Appointments         Provider Department Appt Notes    In 2 months Asim Ziegler MD [facility] Medicare annual 5/3/2024    In 2 months [facility]                    Passed - EGFRCR or GFRNAA > 50     GFR Evaluation  EGFRCR: 91 , resulted on 2/28/2025          Passed - Medication is active on med list

## 2025-05-31 ENCOUNTER — LAB ENCOUNTER (OUTPATIENT)
Dept: LAB | Facility: HOSPITAL | Age: 70
End: 2025-05-31
Attending: NURSE PRACTITIONER
Payer: MEDICARE

## 2025-05-31 DIAGNOSIS — Z12.5 PROSTATE CANCER SCREENING: ICD-10-CM

## 2025-05-31 DIAGNOSIS — I10 ESSENTIAL HYPERTENSION: ICD-10-CM

## 2025-05-31 LAB
ALBUMIN SERPL-MCNC: 4.7 G/DL (ref 3.2–4.8)
ALBUMIN/GLOB SERPL: 2 {RATIO} (ref 1–2)
ALP LIVER SERPL-CCNC: 114 U/L (ref 45–117)
ALT SERPL-CCNC: 26 U/L (ref 10–49)
ANION GAP SERPL CALC-SCNC: 7 MMOL/L (ref 0–18)
AST SERPL-CCNC: 20 U/L (ref ?–34)
BASOPHILS # BLD AUTO: 0.06 X10(3) UL (ref 0–0.2)
BASOPHILS NFR BLD AUTO: 0.9 %
BILIRUB SERPL-MCNC: 1.3 MG/DL (ref 0.2–1.1)
BUN BLD-MCNC: 15 MG/DL (ref 9–23)
BUN/CREAT SERPL: 15.6 (ref 10–20)
CALCIUM BLD-MCNC: 9.6 MG/DL (ref 8.7–10.4)
CHLORIDE SERPL-SCNC: 107 MMOL/L (ref 98–112)
CO2 SERPL-SCNC: 28 MMOL/L (ref 21–32)
COMPLEXED PSA SERPL-MCNC: 2.51 NG/ML (ref ?–4)
CREAT BLD-MCNC: 0.96 MG/DL (ref 0.7–1.3)
DEPRECATED RDW RBC AUTO: 42.3 FL (ref 35.1–46.3)
EGFRCR SERPLBLD CKD-EPI 2021: 87 ML/MIN/1.73M2 (ref 60–?)
EOSINOPHIL # BLD AUTO: 0.27 X10(3) UL (ref 0–0.7)
EOSINOPHIL NFR BLD AUTO: 4 %
ERYTHROCYTE [DISTWIDTH] IN BLOOD BY AUTOMATED COUNT: 12.4 % (ref 11–15)
FASTING STATUS PATIENT QL REPORTED: YES
GLOBULIN PLAS-MCNC: 2.3 G/DL (ref 2–3.5)
GLUCOSE BLD-MCNC: 95 MG/DL (ref 70–99)
HCT VFR BLD AUTO: 49.6 % (ref 39–53)
HGB BLD-MCNC: 16.7 G/DL (ref 13–17.5)
IMM GRANULOCYTES # BLD AUTO: 0.02 X10(3) UL (ref 0–1)
IMM GRANULOCYTES NFR BLD: 0.3 %
LYMPHOCYTES # BLD AUTO: 1.43 X10(3) UL (ref 1–4)
LYMPHOCYTES NFR BLD AUTO: 21.2 %
MCH RBC QN AUTO: 31 PG (ref 26–34)
MCHC RBC AUTO-ENTMCNC: 33.7 G/DL (ref 31–37)
MCV RBC AUTO: 92 FL (ref 80–100)
MONOCYTES # BLD AUTO: 0.61 X10(3) UL (ref 0.1–1)
MONOCYTES NFR BLD AUTO: 9.1 %
NEUTROPHILS # BLD AUTO: 4.35 X10 (3) UL (ref 1.5–7.7)
NEUTROPHILS # BLD AUTO: 4.35 X10(3) UL (ref 1.5–7.7)
NEUTROPHILS NFR BLD AUTO: 64.5 %
OSMOLALITY SERPL CALC.SUM OF ELEC: 295 MOSM/KG (ref 275–295)
PLATELET # BLD AUTO: 207 10(3)UL (ref 150–450)
POTASSIUM SERPL-SCNC: 4 MMOL/L (ref 3.5–5.1)
PROT SERPL-MCNC: 7 G/DL (ref 5.7–8.2)
RBC # BLD AUTO: 5.39 X10(6)UL (ref 3.8–5.8)
SODIUM SERPL-SCNC: 142 MMOL/L (ref 136–145)
TSI SER-ACNC: 4.1 UIU/ML (ref 0.55–4.78)
WBC # BLD AUTO: 6.7 X10(3) UL (ref 4–11)

## 2025-05-31 PROCEDURE — 85025 COMPLETE CBC W/AUTO DIFF WBC: CPT

## 2025-05-31 PROCEDURE — 36415 COLL VENOUS BLD VENIPUNCTURE: CPT

## 2025-05-31 PROCEDURE — 84443 ASSAY THYROID STIM HORMONE: CPT

## 2025-05-31 PROCEDURE — 80053 COMPREHEN METABOLIC PANEL: CPT

## 2025-06-09 ENCOUNTER — OFFICE VISIT (OUTPATIENT)
Dept: INTERNAL MEDICINE CLINIC | Facility: CLINIC | Age: 70
End: 2025-06-09

## 2025-06-09 VITALS
OXYGEN SATURATION: 98 % | TEMPERATURE: 99 F | DIASTOLIC BLOOD PRESSURE: 78 MMHG | SYSTOLIC BLOOD PRESSURE: 156 MMHG | HEIGHT: 70 IN | BODY MASS INDEX: 35.07 KG/M2 | RESPIRATION RATE: 20 BRPM | WEIGHT: 245 LBS | HEART RATE: 84 BPM

## 2025-06-09 DIAGNOSIS — G47.33 OSA (OBSTRUCTIVE SLEEP APNEA): ICD-10-CM

## 2025-06-09 DIAGNOSIS — Z00.00 ENCOUNTER FOR ANNUAL HEALTH EXAMINATION: Primary | ICD-10-CM

## 2025-06-09 DIAGNOSIS — E66.01 CLASS 2 SEVERE OBESITY WITH SERIOUS COMORBIDITY AND BODY MASS INDEX (BMI) OF 35.0 TO 35.9 IN ADULT, UNSPECIFIED OBESITY TYPE (HCC): ICD-10-CM

## 2025-06-09 DIAGNOSIS — R93.1 ELEVATED CORONARY ARTERY CALCIUM SCORE: ICD-10-CM

## 2025-06-09 DIAGNOSIS — Z85.21 HISTORY OF LARYNGEAL CANCER: ICD-10-CM

## 2025-06-09 DIAGNOSIS — M19.90 OSTEOARTHRITIS, UNSPECIFIED OSTEOARTHRITIS TYPE, UNSPECIFIED SITE: ICD-10-CM

## 2025-06-09 DIAGNOSIS — E66.812 CLASS 2 SEVERE OBESITY WITH SERIOUS COMORBIDITY AND BODY MASS INDEX (BMI) OF 35.0 TO 35.9 IN ADULT, UNSPECIFIED OBESITY TYPE (HCC): ICD-10-CM

## 2025-06-09 DIAGNOSIS — I10 WHITE COAT SYNDROME WITH DIAGNOSIS OF HYPERTENSION: ICD-10-CM

## 2025-06-09 DIAGNOSIS — I10 ESSENTIAL HYPERTENSION: ICD-10-CM

## 2025-06-09 DIAGNOSIS — R60.0 BILATERAL LEG EDEMA: ICD-10-CM

## 2025-06-09 RX ORDER — HYDRALAZINE HYDROCHLORIDE 50 MG/1
50 TABLET, FILM COATED ORAL 3 TIMES DAILY
Qty: 270 TABLET | Refills: 3 | Status: SHIPPED | OUTPATIENT
Start: 2025-06-09

## 2025-06-09 NOTE — PROGRESS NOTES
Subjective:   Warner Hare is a 69 year old male who presents for a MA AHA (Medicare Advantage Annual Health Assessment) and Subsequent Annual Wellness visit (Pt already had Initial Annual Wellness) and scheduled follow up of multiple significant but stable problems.   History of Present Illness    Patient is here requesting Medicare annual wellness visit and follow-up on chronic medical issues as listed below.  Last seen in the office on December 9.  Blood pressure was elevated at that time but we did not change anything.  Since that time he has seen cardiology for history of cardiac calcium score of 582 with subsequent normal echocardiogram and treadmill.  Also underlying hypertension and cholesterol issues.  Saw ENT for follow-up on laryngeal cancer as well as enlarged thyroid.  He saw the pulmonary doctor as well.  He was diagnosed with severe obstructive sleep apnea.  Pulmonary doctor advised CPAP titration and CPAP for treatment.  Also been seeing physical therapy quite a bit for his shoulder.  Current medications reviewed.  No complaints and vaccination status reviewed.  Active reviewed.    He has concerns about the CPAP to treat the UMU. He does not feel tired during the day. Sometimes he sleeps in a chair. Still doing PT once a week; working on left hip. Knees still give him pain. He just finished PT for his shoulders with good response.     Patient does check blood pressure at home. It has been running around 140s-150s/--. SOmetimes gets to 120s. Still with 2-3 days at [employer] on [facility]. He average 8,000 steps a day. Also does 35 minutes of stretching every morning. He has been travelling a lot recently. Diet is ok; after the radiation for the cancer; he needs sweet stuff to make the mouth and throat feel ok. No tobacco or EtOH.       History/Other:   Fall Risk Assessment:   He has been screened for Falls and is low risk.      Cognitive Assessment:   He had a completely normal cognitive  assessment - see flowsheet entries     Functional Ability/Status:   Warner Hare has a completely normal functional assessment. See flowsheet for details.      Depression Screening (PHQ):  PHQ-2 SCORE: 0  , done 6/9/2025   If you checked off any problems, how difficult have these problems made it for you to do your work, take care of things at home, or get along with other people?: Not difficult at all    Last [place] Suicide Screening on 6/9/2025 was No Risk.          Advanced Directives:   He does NOT have a Living Will. [Do you have a living will?: No]  He does NOT have a Power of  for Health Care. [Do you have a healthcare power of ?: No]  Discussed Advance Care Planning with patient (and family/surrogate if present). Standard forms made available to patient in After Visit Summary.      Patient Active Problem List   Diagnosis    HTN (hypertension)    History of laryngeal cancer    UMU (obstructive sleep apnea)     Allergies:  He has no known allergies.    Current Medications:  Outpatient Medications Marked as Taking for the 6/9/25 encounter (Office Visit) with Asim Ziegler MD   Medication Sig    hydrALAZINE 50 MG Oral Tab Take 1 tablet (50 mg total) by mouth 2 (two) times daily.    rosuvastatin 20 MG Oral Tab Take 1 tablet (20 mg total) by mouth nightly.    losartan 100 MG Oral Tab Take 1 tablet (100 mg total) by mouth daily.    amLODIPine 10 MG Oral Tab Take 1 tablet (10 mg total) by mouth daily.    hydroCHLOROthiazide 25 MG Oral Tab Take 1 tablet (25 mg total) by mouth daily.    Multiple Vitamins-Minerals (MULTI-VITAMIN/MINERALS) Oral Tab Take 1 tablet by mouth daily.       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  Surgical History:  He  has a past surgical history that includes colonoscopy screening - referral (N/A, 1/24/2017); larynx surg proc unlisted (01/06/2020); hc arthrocentesis or inject intermed joint w us (Right); knee scope,med/lat menisectomy (Right, 01/2019); Umbilical hernia repair (10/27/2020); colonoscopy (02/2022); colonoscopy; and Fracture surgery (Left, 01/25/2021).   Family History:  His family history is not on file.  Social History:  He  reports that he has never smoked. He has never been exposed to tobacco smoke. He has never used smokeless tobacco. He reports that he does not drink alcohol and does not use drugs.    Tobacco:  He has never smoked tobacco.    CAGE Alcohol Screen:   CAGE screening score of 0 on 6/9/2025, showing low risk of alcohol abuse.      Patient Care Team:  Asim Ziegler MD as PCP - General (Internal Medicine)  Alejandro Goldman, PT as Physical Therapist (Physical Therapy)  Briana Smith PTA as Physical Therapist (Physical Therapy)  Bhargav Donis MD (Radiation Oncology)  Vira Ace RN as Registered Nurse (Registered Nurse)  Romero, Griselle, Kalli Stephens MD (Radiation Oncology)  Katty Boyer CMA    Review of Systems       Objective:   Physical Exam  Constitutional:       Appearance: Normal appearance. He is well-developed. He is obese.   HENT:      Right Ear: Tympanic membrane and ear canal normal.      Left Ear: Tympanic membrane and ear canal normal.      Nose: Nose normal.      Mouth/Throat:      Pharynx: No oropharyngeal exudate or posterior oropharyngeal erythema.   Eyes:      Conjunctiva/sclera: Conjunctivae normal.      Pupils: Pupils are equal, round, and reactive to light.   Neck:      Thyroid: No thyromegaly.      Vascular: No carotid bruit.   Cardiovascular:      Rate and Rhythm: Normal rate and regular rhythm.      Pulses: Normal pulses.      Heart  sounds: Normal heart sounds. No murmur heard.  Pulmonary:      Effort: Pulmonary effort is normal.      Breath sounds: Normal breath sounds. No wheezing or rales.   Abdominal:      General: Bowel sounds are normal.      Palpations: Abdomen is soft. There is no mass.      Tenderness: There is no abdominal tenderness.      Hernia: There is no hernia in the left inguinal area.   Genitourinary:     Penis: Normal and circumcised.       Testes: Normal.   Musculoskeletal:      Right lower leg: No edema.      Left lower leg: No edema.   Lymphadenopathy:      Cervical: No cervical adenopathy.   Skin:     General: Skin is warm and dry.      Findings: No rash.   Neurological:      General: No focal deficit present.      Mental Status: He is alert.      Cranial Nerves: No cranial nerve deficit.      Coordination: Coordination normal.   Psychiatric:         Mood and Affect: Mood normal.         Behavior: Behavior normal.         Thought Content: Thought content normal.         Judgment: Judgment normal.          /78 (BP Location: Left arm, Patient Position: Sitting, Cuff Size: large)   Pulse 84   Temp 98.8 °F (37.1 °C) (Other)   Resp 20   Ht 5' 10\" (1.778 m)   Wt 245 lb (111.1 kg)   SpO2 98%   BMI 35.15 kg/m²  Estimated body mass index is 35.15 kg/m² as calculated from the following:    Height as of this encounter: 5' 10\" (1.778 m).    Weight as of this encounter: 245 lb (111.1 kg).    Medicare Hearing Assessment:   Hearing Screening    Screening Method: Questionnaire  I have a problem hearing over the telephone: No I have trouble following the conversations when two or more people are talking at the same time: No   I have trouble understanding things on the TV: No I have to strain to understand conversations: No   I have to worry about missing the telephone ring or doorbell: No I have trouble hearing conversations in a noisy background such as a crowded room or restaurant: No   I get confused about where sounds  come from: No I misunderstand some words in a sentence and need to ask people to repeat themselves: No   I especially have trouble understanding the speech of women and children: No I have trouble understanding the speaker in a large room such as at a meeting or place of Confucianism: No   Many people I talk to seem to mumble (or don't speak clearly): No People get annoyed because I misunderstand what they say: No   I misunderstand what others are saying and make inappropriate responses: No I avoid social activities because I cannot hear well and fear I will reply improperly: No   Family members and friends have told me they think I may have hearing loss: No             Visual Acuity:   Right Eye Visual Acuity: Corrected Right Eye Chart Acuity: 20/70   Left Eye Visual Acuity: Corrected Left Eye Chart Acuity: 20/100   Both Eyes Visual Acuity: Corrected Both Eyes Chart Acuity: 20/70   Able To Tolerate Visual Acuity: Yes        Assessment & Plan:   Warner Hare is a 69 year old male who presents for a Medicare Assessment.     1. Encounter for annual health examination  Physical exam remarkable for elevated blood pressure.  Active issues as below.  Health maintenance issues reviewed.  Advanced directives discussed in detail and printed information given to the patient.  Encouraged to read that over and proceed with that with family members.    2. Essential hypertension  Still not adequately controlled.  We will increase hydralazine 50 mg from twice a day up to 3 times a day.  Spent a lot of time trying to convince the patient to get his sleep study done.  I believe that treating his severe obstructive sleep apnea would help his blood pressure.    3. White coat syndrome with diagnosis of hypertension  Blood pressure elevated here more than at home but still even at home it is elevated.  Increase hydralazine as above.    4. History of laryngeal cancer  No evidence of recurrence.  Follow-up with ENT doctor.    5.  Osteoarthritis, unspecified osteoarthritis type, unspecified site  Stable.  Continue current treatment.    6. UMU (obstructive sleep apnea)  Severe obstructive sleep apnea diagnosed over the last year.  Patient has not proceeded with the CPAP titration.  He feels he will never tolerate the CPAP.  Spent quite a bit of time discussing the importance of treatment in someone such as himself with high blood pressure, heart disease, lower extremity edema.  Discussed various types of masks he can wear that would help to get along with his sleeping positions.  I strongly believe he should get the CPAP titration and give that a chance.  We discussed alternative treatments as well.    7. Elevated coronary artery calcium score  Stable.  Negative stress test in the past.  Continue current treatment.    8. Bilateral leg edema  Likely related to the amlodipine, varicose veins, and untreated sleep apnea.  Continue current dose of hydrochlorothiazide.  Try to get better control of blood pressure.  Proceed with treatment for sleep apnea.    9. Class 2 severe obesity with serious comorbidity and body mass index (BMI) of 35.0 to 35.9 in adult, unspecified obesity type (HCC)  Needs to make long-term commitment to diet, exercise, weight loss.  Comorbidities of obesity include the hypertension and the obstructive sleep apnea.        Assessment & Plan      The patient indicates understanding of these issues and agrees to the plan.  Reinforced healthy diet, lifestyle, and exercise.      No follow-ups on file.     Asim Ziegler MD, 6/9/2025     Supplementary Documentation:   General Health:  In the past six months, have you lost more than 10 pounds without trying?: (Patient-Rptd) 2 - No  Has your appetite been poor?: (Patient-Rptd) No  Type of Diet: (Patient-Rptd) Balanced  How does the patient maintain a good energy level?: (Patient-Rptd) Appropriate Exercise, Daily Walks, Stretching  How would you describe your daily physical activity?:  (Patient-Rptd) Heavy  How would you describe your current health state?: (Patient-Rptd) Good  How do you maintain positive mental well-being?: (Patient-Rptd) Social Interaction  On a scale of 0 to 10, with 0 being no pain and 10 being severe pain, what is your pain level?: (Patient-Rptd) 4 - (Moderate)  In the past six months, have you experienced urine leakage?: (Patient-Rptd) 0-No  At any time do you feel concerned for the safety/well-being of yourself and/or your children, in your home or elsewhere?: No  Have you had any immunizations at another office such as Influenza, Hepatitis B, Tetanus, or Pneumococcal?: (Patient-Rptd) No    Health Maintenance   Topic Date Due    COVID-19 Vaccine (3 - 2024-25 season) 09/01/2024    Annual Well Visit  01/01/2025    Influenza Vaccine (Season Ended) 10/01/2025    Colorectal Cancer Screening  01/31/2027    PSA  05/31/2027    Annual Depression Screening  Completed    Fall Risk Screening (Annual)  Completed    Pneumococcal Vaccine: 50+ Years  Completed    Zoster Vaccines  Completed    Meningococcal B Vaccine  Aged Out

## 2025-07-31 ENCOUNTER — TELEPHONE (OUTPATIENT)
Dept: INTERNAL MEDICINE CLINIC | Facility: CLINIC | Age: 70
End: 2025-07-31

## (undated) DIAGNOSIS — Z86.010 HISTORY OF COLONIC POLYPS: ICD-10-CM

## (undated) DEVICE — VIOLET BRAIDED (POLYGLACTIN 910), SYNTHETIC ABSORBABLE SUTURE: Brand: COATED VICRYL

## (undated) DEVICE — ENCORE® LATEX MICRO SIZE 8, STERILE LATEX POWDER-FREE SURGICAL GLOVE: Brand: ENCORE

## (undated) DEVICE — DRAPE SHEET LAPAROTOMY

## (undated) DEVICE — SUTURE ETHIBOND 1 CT-1

## (undated) DEVICE — MINOR GENERAL: Brand: MEDLINE INDUSTRIES, INC.

## (undated) DEVICE — UNDYED BRAIDED (POLYGLACTIN 910), SYNTHETIC ABSORBABLE SUTURE: Brand: COATED VICRYL

## (undated) DEVICE — SOL  .9 1000ML BTL

## (undated) DEVICE — ENCORE® LATEX ACCLAIM SIZE 8, STERILE LATEX POWDER-FREE SURGICAL GLOVE: Brand: ENCORE

## (undated) NOTE — LETTER
3/2/2020          To Whom It May Concern:    Renuka Spencer is currently under my medical care and may not fly our of the country at this time. Pt is susceptible to infection due to recent radiation treatments.    If you require additional information p

## (undated) NOTE — LETTER
2/4/2019          To Whom It May Concern:    Jenny Johnson is currently under my medical care and may not return to work at this time. Please excuse Jamison Mcardle for 2 weeks. If you require additional information please contact our office.         Kayleen Garcia

## (undated) NOTE — MR AVS SNAPSHOT
[address] [address]  [phone]               Thank you for choosing us for your health care visit with Joy Schmid MD.  We are glad to serve you and happy to provide you with this summar Hours:  24-hours Phone:  [phone]    - AmLODIPine Besylate 10 MG Tabs  - Losartan Potassium-HCTZ 100-12.5 MG Tabs            [employer]     Sign up for [employer], your secure online medical record.   [employer] will allow you to access patient instructions fro Start activities slowly and build up over time Do what you like   Get your heart pumping – brisk walking, biking, swimming Even 10 minute increments are effective and add up over the week   2 ½ hours per week – spread out over time Use a hong to keep you

## (undated) NOTE — LETTER
3/13/2019          To Whom It May Concern:    Alissa Hargrove is currently under my medical care and may return to work at this time.     Activity is restricted as follows: Light duty with no lifting greater than 10 pounds, no kneeling, crawling or squatt

## (undated) NOTE — LETTER
4/18/2019          To Whom It May Concern:    Shirley Valentine is currently under my medical care. He may return to work but for the next 4 weeks please allow for no kneeling, no climbing, no crawling, no lifting greater than 20 pounds.   If you require a

## (undated) NOTE — LETTER
2/18/2019          To Whom It May Concern:    Jhonathan Grant is currently under my medical care and may return to work at this time.     Activity is restricted as follows: Light duty with no lifting greater than 10 pounds, no kneeling, crawling or squatt

## (undated) NOTE — LETTER
June 11, 2018     Lexi Alejandro  [facility] [address] [place] [address]    Dear Van Child:    Below are the results from your recent visit:  Resulted Orders   COMP METABOLIC PANEL (14)   Result Value Ref Range    Glucose 107 (H) 70 - 99 mg/dL    Sodium 139 1 The blood cell count is normal. There is no evidence of anemia or infection. The blood sugar (glucose) level is near normal. There is no evidence of diabetes.     The electrolytes levels in the blood are normal.    The kidney and liver function tests are

## (undated) NOTE — LETTER
2/11/2019          To Whom It May Concern:    Corrinne Lints is currently under my medical care. He should be off work completely for 4 weeks from the date of surgery. Then light duty no lifting greater than 10 pounds, ground-level work, no climbing or crawling for an additional 4 weeks. Then return to work full duty without restrictions. If you require additional information please contact our office.         Sincerely,    Nupur Neff MD

## (undated) NOTE — LETTER
5/22/2019          To Whom It May Concern:    Herminio Reddy is currently under my medical care. He may return to work tomorrow May 23, 2019 without restrictions. He is able to lift 100 pounds. Activity is restricted as follows: none.     If you req

## (undated) NOTE — Clinical Note
April 23, 2017     [facility] [address] [place] [address]      Dear Miguelina Delacruz:  Below are the results from your recent visit:  Resulted Orders   COMP METABOLIC PANEL (14)   Result Value Ref Range    Glucose 102 (H) 70-99 mg/dL    Sodium 139 The blood cell count is normal. There is no evidence of anemia or infection. The blood sugar (glucose) level is normal. There is no evidence of diabetes.     The electrolytes levels in the blood are normal. The kidney and liver function tests are all nor

## (undated) NOTE — LETTER
07/19/18        [address] [place] [address] [place] [address]      Dear Aston Webb,    [facility] records indicate that you have outstanding lab work and or testing that was ordered for you and has not yet been completed:    Basic Metabolic Panel (8) [E]

## (undated) NOTE — LETTER
7/17/2018              [address]        [facility]         Dear Jamison Mcardle,    This letter is to inform you that our office has made several attempts to reach you by phone without success.   We were attempting to contact

## (undated) NOTE — LETTER
11/11/2020          To Whom It May Concern:    Sharla Leger is currently under my medical care and may not return to work at this time. He may return to work on 12/7/2020. Activity is restricted as follows: none.     If you require additional infor

## (undated) NOTE — LETTER
11/24/2021    [address] [place] [id number]            Dear Anna Dickinson,      Our records indicate that you are due for an appointment for a Colonoscopy in January 2022, or sometime there after, with Allie Staples